# Patient Record
Sex: FEMALE | Race: BLACK OR AFRICAN AMERICAN | Employment: OTHER | URBAN - METROPOLITAN AREA
[De-identification: names, ages, dates, MRNs, and addresses within clinical notes are randomized per-mention and may not be internally consistent; named-entity substitution may affect disease eponyms.]

---

## 2017-09-09 ENCOUNTER — HOSPITAL ENCOUNTER (EMERGENCY)
Age: 22
Discharge: HOME | End: 2017-09-10
Attending: PEDIATRICS
Payer: COMMERCIAL

## 2017-09-09 DIAGNOSIS — R52 BODY ACHES: ICD-10-CM

## 2017-09-09 DIAGNOSIS — B34.9 VIRAL ILLNESS: Primary | ICD-10-CM

## 2017-09-09 PROCEDURE — 99283 EMERGENCY DEPT VISIT LOW MDM: CPT

## 2017-09-10 VITALS
OXYGEN SATURATION: 100 % | HEART RATE: 90 BPM | RESPIRATION RATE: 18 BRPM | DIASTOLIC BLOOD PRESSURE: 71 MMHG | TEMPERATURE: 98.6 F | SYSTOLIC BLOOD PRESSURE: 111 MMHG

## 2017-09-10 LAB
APPEARANCE UR: CLEAR
BACTERIA URNS QL MICRO: NEGATIVE /HPF
BILIRUB UR QL: NEGATIVE
COLOR UR: NORMAL
EPITH CASTS URNS QL MICRO: NORMAL /LPF
GLUCOSE UR STRIP.AUTO-MCNC: NEGATIVE MG/DL
HGB UR QL STRIP: NEGATIVE
HYALINE CASTS URNS QL MICRO: NORMAL /LPF (ref 0–5)
KETONES UR QL STRIP.AUTO: NEGATIVE MG/DL
LEUKOCYTE ESTERASE UR QL STRIP.AUTO: NEGATIVE
NITRITE UR QL STRIP.AUTO: NEGATIVE
PH UR STRIP: 5.5 [PH] (ref 5–8)
PROT UR STRIP-MCNC: NEGATIVE MG/DL
RBC #/AREA URNS HPF: NORMAL /HPF (ref 0–5)
SP GR UR REFRACTOMETRY: 1.02 (ref 1–1.03)
UROBILINOGEN UR QL STRIP.AUTO: 0.2 EU/DL (ref 0.2–1)
WBC URNS QL MICRO: NORMAL /HPF (ref 0–4)

## 2017-09-10 PROCEDURE — 81001 URINALYSIS AUTO W/SCOPE: CPT | Performed by: PEDIATRICS

## 2017-09-10 PROCEDURE — 81025 URINE PREGNANCY TEST: CPT

## 2017-09-10 PROCEDURE — 74011250636 HC RX REV CODE- 250/636: Performed by: PEDIATRICS

## 2017-09-10 RX ORDER — IBUPROFEN 800 MG/1
800 TABLET ORAL
Qty: 20 TAB | Refills: 0 | Status: SHIPPED | OUTPATIENT
Start: 2017-09-10 | End: 2017-09-17

## 2017-09-10 RX ORDER — DEXAMETHASONE 4 MG/1
TABLET ORAL
Status: DISCONTINUED
Start: 2017-09-10 | End: 2017-09-10 | Stop reason: HOSPADM

## 2017-09-10 RX ORDER — DEXAMETHASONE 4 MG/1
12 TABLET ORAL
Status: COMPLETED | OUTPATIENT
Start: 2017-09-10 | End: 2017-09-10

## 2017-09-10 RX ADMIN — DEXAMETHASONE 12 MG: 4 TABLET ORAL at 01:14

## 2017-09-10 NOTE — DISCHARGE INSTRUCTIONS
Viral Infections: Care Instructions  Your Care Instructions  You don't feel well, but it's not clear what's causing it. You may have a viral infection. Viruses cause many illnesses, such as the common cold, influenza, fever, rashes, and the diarrhea, nausea, and vomiting that are often called \"stomach flu. \" You may wonder if antibiotic medicines could make you feel better. But antibiotics only treat infections caused by bacteria. They don't work on viruses. The good news is that viral infections usually aren't serious. Most will go away in a few days without medical treatment. In the meantime, there are a few things you can do to make yourself more comfortable. Follow-up care is a key part of your treatment and safety. Be sure to make and go to all appointments, and call your doctor if you are having problems. It's also a good idea to know your test results and keep a list of the medicines you take. How can you care for yourself at home? · Get plenty of rest if you feel tired. · Take an over-the-counter pain medicine if needed, such as acetaminophen (Tylenol), ibuprofen (Advil, Motrin), or naproxen (Aleve). Read and follow all instructions on the label. · Be careful when taking over-the-counter cold or flu medicines and Tylenol at the same time. Many of these medicines have acetaminophen, which is Tylenol. Read the labels to make sure that you are not taking more than the recommended dose. Too much acetaminophen (Tylenol) can be harmful. · Drink plenty of fluids, enough so that your urine is light yellow or clear like water. If you have kidney, heart, or liver disease and have to limit fluids, talk with your doctor before you increase the amount of fluids you drink. · Stay home from work, school, and other public places while you have a fever. When should you call for help? Call 911 anytime you think you may need emergency care. For example, call if:  · You have severe trouble breathing.   · You passed out (lost consciousness). Call your doctor now or seek immediate medical care if:  · You seem to be getting much sicker. · You have a new or higher fever. · You have blood in your stools. · You have new belly pain, or your pain gets worse. · You have a new rash. Watch closely for changes in your health, and be sure to contact your doctor if:  · You start to get better and then get worse. · You do not get better as expected. Where can you learn more? Go to http://michaela-maximilian.info/. Enter V131 in the search box to learn more about \"Viral Infections: Care Instructions. \"  Current as of: March 3, 2017  Content Version: 11.3  © 9563-6240 [employer]. Care instructions adapted under license by [employer] (which disclaims liability or warranty for this information). If you have questions about a medical condition or this instruction, always ask your healthcare professional. [place] [address] any warranty or liability for your use of this information. We hope that we have addressed all of your medical concerns. The examination and treatment you received in the Emergency Department were for an emergent problem and were not intended as complete care. It is important that you follow up with your healthcare provider(s) for ongoing care. If your symptoms worsen or do not improve as expected, and you are unable to reach your usual health care provider(s), you should return to the Emergency Department. Today's healthcare is undergoing tremendous change, and patient satisfaction surveys are one of the many tools to assess the quality of medical care. You may receive a survey from the [employer] organization regarding your experience in the Emergency Department. I hope that your experience has been completely positive, particularly the medical care that I provided.   As such, please participate in the survey; anything less than excellent does not meet my expectations or intentions. Thank you for allowing us to provide you with medical care today. We realize that you have many choices for your emergency care needs. Please choose us in the future for any continued health care needs.       Ban Manuel MD    [address] [facility], [facility].   Office: [phone]            Recent Results (from the past 24 hour(s))   URINALYSIS W/MICROSCOPIC    Collection Time: 09/10/17 12:46 AM   Result Value Ref Range    Color YELLOW/STRAW      Appearance CLEAR CLEAR      Specific gravity 1.020 1.003 - 1.030      pH (UA) 5.5 5.0 - 8.0      Protein NEGATIVE  NEG mg/dL    Glucose NEGATIVE  NEG mg/dL    Ketone NEGATIVE  NEG mg/dL    Bilirubin NEGATIVE  NEG      Blood NEGATIVE  NEG      Urobilinogen 0.2 0.2 - 1.0 EU/dL    Nitrites NEGATIVE  NEG      Leukocyte Esterase NEGATIVE  NEG      WBC 0-4 0 - 4 /hpf    RBC 0-5 0 - 5 /hpf    Epithelial cells FEW FEW /lpf    Bacteria NEGATIVE  NEG /hpf    Hyaline cast 0-2 0 - 5 /lpf

## 2017-09-10 NOTE — ED TRIAGE NOTES
Triage note: Pt states she has been experiencing fever and generalized body aches since yesterday. Pt was seen at urgent care for the same symptoms yesterday.   +nausea

## 2017-09-10 NOTE — ED PROVIDER NOTES
Patient is a 22 y.o. female presenting with general illness. The history is provided by the patient. Generalized Body Aches   This is a new problem. The current episode started yesterday (head to toe. Motrin helps. Went to Urgent care and gave bolus. BG 76. Told to increase PO.  ). The problem occurs constantly. The problem has been gradually improving. Pertinent negatives include no chest pain, no abdominal pain, no headaches and no shortness of breath. The symptoms are aggravated by bending, twisting and exertion. The symptoms are relieved by relaxation and NSAIDs. Tonight with increased aches and fever 102 at home. Motrin helped again. Went to [address] [place] [address] ED and left due to wait. Mild pain now. Feeling much better. No sore throat, injury, sick contacts. No change in UOP. No V/D. No URI or UTI symptoms. IMM UTD    Past Medical History:   Diagnosis Date    Asthma        History reviewed. No pertinent surgical history. History reviewed. No pertinent family history. Social History     Social History    Marital status: SINGLE     Spouse name: N/A    Number of children: N/A    Years of education: N/A     Occupational History    Not on file. Social History Main Topics    Smoking status: Never Smoker    Smokeless tobacco: Never Used    Alcohol use No    Drug use: No    Sexual activity: Not on file     Other Topics Concern    Not on file     Social History Narrative    No narrative on file         ALLERGIES: Afrin [pseudoephedrine sulfate]    Review of Systems   Constitutional: Positive for fever. Negative for activity change, appetite change, chills and diaphoresis. HENT: Negative for congestion, drooling, facial swelling, sore throat and trouble swallowing. Eyes: Negative for visual disturbance. Respiratory: Negative for shortness of breath. Cardiovascular: Negative for chest pain and palpitations.    Gastrointestinal: Negative for abdominal pain, diarrhea, nausea and vomiting. Endocrine: Negative for polydipsia and polyuria. Genitourinary: Negative for decreased urine volume and dysuria. Musculoskeletal: Positive for myalgias. Negative for gait problem, neck pain and neck stiffness. Allergic/Immunologic: Negative for immunocompromised state. Neurological: Negative for syncope, light-headedness, numbness and headaches. Hematological: Negative for adenopathy. Does not bruise/bleed easily. Psychiatric/Behavioral: Negative for confusion. Vitals:    09/10/17 0004   BP: 106/63   Pulse: (!) 112   Resp: 19   Temp: 100 °F (37.8 °C)   SpO2: 97%            Physical Exam   Physical Exam   Constitutional: Appears well-developed and well-nourished. active. No distress. HENT:   Head: NCAT  Ears: Right Ear: Tympanic membrane normal. Left Ear: Tympanic membrane normal.   Nose: Nose normal. No nasal discharge. Mouth/Throat: Mucous membranes are moist. Pharynx is normal.   Eyes: Conjunctivae are normal. Right eye exhibits no discharge. Left eye exhibits no discharge. Neck: Normal range of motion. Neck supple. Cardiovascular: Normal rate, regular rhythm, S1 normal and S2 normal.  .       2+ distal pulses   Pulmonary/Chest: Effort normal and breath sounds normal. No nasal flaring or stridor. No respiratory distress. no wheezes. no rhonchi. no rales. no retraction. Abdominal: Soft. . No tenderness. no guarding. No hernia. No masses or HSM. NO CVA tenderness  Musculoskeletal: Normal range of motion. no edema,  no deformity and no signs of injury. generalized muscle pain. Lymphadenopathy:     no cervical adenopathy. Neurological:  alert. normal strength. normal muscle tone. No focal deficits. CN 2-12 intact  Skin: Skin is warm and dry. Capillary refill takes less than 3 seconds. Turgor is normal. No petechiae, no purpura and no rash noted. No cyanosis. MDM  ED Course   Patient is well hydrated, well appearing, and in no respiratory distress.  Physical exam is reassuring, and without signs of serious illness. UA is clear and no sign of blood, dehydration or myoglobin. Symptoms likely secondary to a viral syndrome. Will discharge patient home with supportive care, and follow-up with PCP within the next few days. Decadron for symptom control x1. ICD-10-CM ICD-9-CM   1. Viral illness B34.9 079.99   2. Body aches R52 780.96       Current Discharge Medication List      START taking these medications    Details   ibuprofen (MOTRIN) 800 mg tablet Take 1 Tab by mouth every six (6) hours as needed for Pain for up to 7 days. Qty: 20 Tab, Refills: 0             Follow-up Information     Follow up With Details Comments Contact Info    Kailey Burns [phone] In 3 days As needed           I have reviewed discharge instructions with the parent. The parent verbalized understanding. 1:11 AM  David Love M.D.       Procedures

## 2017-09-10 NOTE — ED NOTES
Patient education given on medication and the patient expresses understanding and acceptance of instructions.  Nadege Alan RN 9/10/2017 1:26 AM

## 2017-09-11 LAB — HCG UR QL: NEGATIVE

## 2017-10-09 ENCOUNTER — HOSPITAL ENCOUNTER (EMERGENCY)
Age: 22
Discharge: HOME | End: 2017-10-09
Attending: PEDIATRICS
Payer: COMMERCIAL

## 2017-10-09 ENCOUNTER — APPOINTMENT (OUTPATIENT)
Dept: ULTRASOUND IMAGING | Age: 22
End: 2017-10-09
Attending: NURSE PRACTITIONER
Payer: COMMERCIAL

## 2017-10-09 VITALS
WEIGHT: 119.49 LBS | RESPIRATION RATE: 16 BRPM | SYSTOLIC BLOOD PRESSURE: 125 MMHG | TEMPERATURE: 98.2 F | DIASTOLIC BLOOD PRESSURE: 72 MMHG | HEART RATE: 72 BPM | OXYGEN SATURATION: 99 %

## 2017-10-09 DIAGNOSIS — N61.0 MASTITIS, LEFT, ACUTE: Primary | ICD-10-CM

## 2017-10-09 LAB — HCG UR QL: NEGATIVE

## 2017-10-09 PROCEDURE — 81025 URINE PREGNANCY TEST: CPT

## 2017-10-09 PROCEDURE — 99283 EMERGENCY DEPT VISIT LOW MDM: CPT

## 2017-10-09 PROCEDURE — 76642 ULTRASOUND BREAST LIMITED: CPT

## 2017-10-09 RX ORDER — CLINDAMYCIN HYDROCHLORIDE 300 MG/1
300 CAPSULE ORAL 3 TIMES DAILY
Qty: 21 CAP | Refills: 0 | Status: SHIPPED | OUTPATIENT
Start: 2017-10-09 | End: 2017-10-16

## 2017-10-09 NOTE — ED TRIAGE NOTES
Pt c/ left breast pain x 2 days. Pt states she had fever yesterday that gave her generalized pain everywhere.

## 2017-10-09 NOTE — DISCHARGE INSTRUCTIONS
Mastitis: Care Instructions  Your Care Instructions  Mastitis is an inflammation of the breast. It occurs most often in women who are breastfeeding, but it can affect any woman. Mastitis can be caused by poor milk flow from the breast. When milk builds up in a breast, it leaks into the nearby breast tissue. Infection can also develop when the nipples become cracked or irritated. The tissue can then become infected with bacteria. Antibiotics can usually cure mastitis. For women who are nursing, continued breastfeeding (or pumping) can help. If mastitis is not treated, a pocket of pus may form in the breast and need to be drained. Follow-up care is a key part of your treatment and safety. Be sure to make and go to all appointments, and call your doctor if you are having problems. Its also a good idea to know your test results and keep a list of the medicines you take. How can you care for yourself at home? · If your doctor prescribed antibiotics, take them as directed. Do not stop taking them just because you feel better. You need to take the full course of antibiotics. · If you are breastfeeding, continue breastfeeding or pumping breast milk. It is important to empty your breasts regularly, every 2 to 3 hours while you are awake. These tips may help:  ¨ Before breastfeeding, place a warm, wet washcloth over your breast for about 15 minutes. Try this at least 3 times a day. This increases milk flow in the breast. Massaging the affected breast may also increase milk flow. ¨ Breastfeed on both sides. Try to start with your healthy breast first. Then, after your milk is flowing, breastfeed from the affected breast until it feels soft. You should empty this breast completely. Then switch back to the healthy breast and breastfeed until your baby has finished. ¨ Pump or hand-express a small amount of breast milk before breastfeeding if your breasts are too full with milk.  This will make your breasts less full and may make it easier for your baby to latch on to your breast.  ¨ Pump or express milk from the affected breast if it hurts too much to breastfeed. · Take an over-the-counter pain medicine, such as acetaminophen (Tylenol) or ibuprofen (Advil, Motrin) to relieve pain and fever. Read and follow all instructions on the label. · Do not take two or more pain medicines at the same time unless the doctor told you to. Many pain medicines have acetaminophen, which is Tylenol. Too much acetaminophen (Tylenol) can be harmful. · Rest as much as possible. · Drink extra fluids. · If pus is draining from your infected breast, wash the nipple gently and let it air-dry before you put your bra back on. A disposable breast pad placed in the bra cup may absorb the pus. When should you call for help? Call your doctor now or seek immediate medical care if:  · Any part of your breast becomes increasingly red, painful, swollen, or hot. · You have a new or higher fever. · You have new chills or body aches. Watch closely for changes in your health, and be sure to contact your doctor if:  · You do not get better within 2 days. Where can you learn more? Go to http://michaela-maximilian.info/. Enter Y207 in the search box to learn more about \"Mastitis: Care Instructions. \"  Current as of: May 30, 2016  Content Version: 11.3  © 3090-3605 [employer]. Care instructions adapted under license by [employer] (which disclaims liability or warranty for this information). If you have questions about a medical condition or this instruction, always ask your healthcare professional. [place] [address] any warranty or liability for your use of this information.

## 2017-10-09 NOTE — Clinical Note
Cool compresses and cabbage leaves to left breast 
You can continue motrin 600 mg by mouth every 6 hours or 800 every 8 hours as needed for pain

## 2017-10-09 NOTE — LETTER
[address] 
[address] [facility] DEPT 
[address] [place], [place] [address] [id number] 
[phone] Work/School Note Date: 10/9/2017 To Whom It May concern: 
 
Joceline June was seen and treated today in the emergency room by the following provider(s): 
Attending Provider: Jackie Brady MD 
Nurse Practitioner: Wendy Gayle NP. Joceline June may return to school on . Sincerely, 
 
 
 
 
Wendy Gayle NP

## 2018-01-29 ENCOUNTER — HOSPITAL ENCOUNTER (EMERGENCY)
Age: 23
Discharge: HOME | End: 2018-01-30
Attending: EMERGENCY MEDICINE | Admitting: EMERGENCY MEDICINE
Payer: COMMERCIAL

## 2018-01-29 ENCOUNTER — APPOINTMENT (OUTPATIENT)
Dept: ULTRASOUND IMAGING | Age: 23
End: 2018-01-29
Attending: EMERGENCY MEDICINE
Payer: COMMERCIAL

## 2018-01-29 ENCOUNTER — APPOINTMENT (OUTPATIENT)
Dept: CT IMAGING | Age: 23
End: 2018-01-29
Attending: EMERGENCY MEDICINE
Payer: COMMERCIAL

## 2018-01-29 DIAGNOSIS — R10.32 ABDOMINAL PAIN, LLQ (LEFT LOWER QUADRANT): Primary | ICD-10-CM

## 2018-01-29 LAB
ALBUMIN SERPL-MCNC: 4.1 G/DL (ref 3.5–5)
ALBUMIN/GLOB SERPL: 1 {RATIO} (ref 1.1–2.2)
ALP SERPL-CCNC: 96 U/L (ref 45–117)
ALT SERPL-CCNC: 13 U/L (ref 12–78)
ANION GAP SERPL CALC-SCNC: 7 MMOL/L (ref 5–15)
APPEARANCE UR: CLEAR
AST SERPL-CCNC: 8 U/L (ref 15–37)
BACTERIA URNS QL MICRO: NEGATIVE /HPF
BASOPHILS # BLD: 0 K/UL (ref 0–0.1)
BASOPHILS NFR BLD: 0 % (ref 0–1)
BILIRUB SERPL-MCNC: 1.1 MG/DL (ref 0.2–1)
BILIRUB UR QL: NEGATIVE
BUN SERPL-MCNC: 7 MG/DL (ref 6–20)
BUN/CREAT SERPL: 9 (ref 12–20)
CALCIUM SERPL-MCNC: 8.8 MG/DL (ref 8.5–10.1)
CHLORIDE SERPL-SCNC: 103 MMOL/L (ref 97–108)
CK SERPL-CCNC: 104 U/L (ref 26–192)
CLUE CELLS VAG QL WET PREP: NORMAL
CO2 SERPL-SCNC: 27 MMOL/L (ref 21–32)
COLOR UR: NORMAL
CREAT SERPL-MCNC: 0.77 MG/DL (ref 0.55–1.02)
DIFFERENTIAL METHOD BLD: ABNORMAL
EOSINOPHIL # BLD: 0.1 K/UL (ref 0–0.4)
EOSINOPHIL NFR BLD: 1 % (ref 0–7)
EPITH CASTS URNS QL MICRO: NORMAL /LPF
ERYTHROCYTE [DISTWIDTH] IN BLOOD BY AUTOMATED COUNT: 14.5 % (ref 11.5–14.5)
FLUAV AG NPH QL IA: NEGATIVE
FLUBV AG NOSE QL IA: NEGATIVE
GLOBULIN SER CALC-MCNC: 4.1 G/DL (ref 2–4)
GLUCOSE SERPL-MCNC: 90 MG/DL (ref 65–100)
GLUCOSE UR STRIP.AUTO-MCNC: NEGATIVE MG/DL
HCG UR QL: NEGATIVE
HCT VFR BLD AUTO: 39.2 % (ref 35–47)
HGB BLD-MCNC: 12.8 G/DL (ref 11.5–16)
HGB UR QL STRIP: NEGATIVE
HYALINE CASTS URNS QL MICRO: NORMAL /LPF (ref 0–5)
IMM GRANULOCYTES # BLD: 0 K/UL (ref 0–0.04)
IMM GRANULOCYTES NFR BLD AUTO: 0 % (ref 0–0.5)
KETONES UR QL STRIP.AUTO: NEGATIVE MG/DL
KOH PREP SPEC: NORMAL
LEUKOCYTE ESTERASE UR QL STRIP.AUTO: NEGATIVE
LYMPHOCYTES # BLD: 0.4 K/UL (ref 0.8–3.5)
LYMPHOCYTES NFR BLD: 6 % (ref 12–49)
MCH RBC QN AUTO: 25.7 PG (ref 26–34)
MCHC RBC AUTO-ENTMCNC: 32.7 G/DL (ref 30–36.5)
MCV RBC AUTO: 78.6 FL (ref 80–99)
MONOCYTES # BLD: 0.7 K/UL (ref 0–1)
MONOCYTES NFR BLD: 11 % (ref 5–13)
NEUTS SEG # BLD: 5.5 K/UL (ref 1.8–8)
NEUTS SEG NFR BLD: 82 % (ref 32–75)
NITRITE UR QL STRIP.AUTO: NEGATIVE
NRBC # BLD: 0 K/UL (ref 0–0.01)
NRBC BLD-RTO: 0 PER 100 WBC
PH UR STRIP: 6.5 [PH] (ref 5–8)
PLATELET # BLD AUTO: 320 K/UL (ref 150–400)
PMV BLD AUTO: 10.3 FL (ref 8.9–12.9)
POTASSIUM SERPL-SCNC: 3.6 MMOL/L (ref 3.5–5.1)
PROT SERPL-MCNC: 8.2 G/DL (ref 6.4–8.2)
PROT UR STRIP-MCNC: NEGATIVE MG/DL
RBC # BLD AUTO: 4.99 M/UL (ref 3.8–5.2)
RBC #/AREA URNS HPF: NORMAL /HPF (ref 0–5)
RBC MORPH BLD: ABNORMAL
SERVICE CMNT-IMP: NORMAL
SODIUM SERPL-SCNC: 137 MMOL/L (ref 136–145)
SP GR UR REFRACTOMETRY: 1.01 (ref 1–1.03)
T VAGINALIS VAG QL WET PREP: NORMAL
UR CULT HOLD, URHOLD: NORMAL
UROBILINOGEN UR QL STRIP.AUTO: 0.2 EU/DL (ref 0.2–1)
WBC # BLD AUTO: 6.7 K/UL (ref 3.6–11)
WBC URNS QL MICRO: NORMAL /HPF (ref 0–4)

## 2018-01-29 PROCEDURE — 36415 COLL VENOUS BLD VENIPUNCTURE: CPT | Performed by: STUDENT IN AN ORGANIZED HEALTH CARE EDUCATION/TRAINING PROGRAM

## 2018-01-29 PROCEDURE — 81001 URINALYSIS AUTO W/SCOPE: CPT | Performed by: STUDENT IN AN ORGANIZED HEALTH CARE EDUCATION/TRAINING PROGRAM

## 2018-01-29 PROCEDURE — 74177 CT ABD & PELVIS W/CONTRAST: CPT

## 2018-01-29 PROCEDURE — 74011000258 HC RX REV CODE- 258: Performed by: EMERGENCY MEDICINE

## 2018-01-29 PROCEDURE — 87210 SMEAR WET MOUNT SALINE/INK: CPT | Performed by: EMERGENCY MEDICINE

## 2018-01-29 PROCEDURE — 76856 US EXAM PELVIC COMPLETE: CPT

## 2018-01-29 PROCEDURE — 87804 INFLUENZA ASSAY W/OPTIC: CPT | Performed by: EMERGENCY MEDICINE

## 2018-01-29 PROCEDURE — 81025 URINE PREGNANCY TEST: CPT

## 2018-01-29 PROCEDURE — 85025 COMPLETE CBC W/AUTO DIFF WBC: CPT | Performed by: STUDENT IN AN ORGANIZED HEALTH CARE EDUCATION/TRAINING PROGRAM

## 2018-01-29 PROCEDURE — 74011636320 HC RX REV CODE- 636/320: Performed by: EMERGENCY MEDICINE

## 2018-01-29 PROCEDURE — 80053 COMPREHEN METABOLIC PANEL: CPT | Performed by: STUDENT IN AN ORGANIZED HEALTH CARE EDUCATION/TRAINING PROGRAM

## 2018-01-29 PROCEDURE — 87491 CHLMYD TRACH DNA AMP PROBE: CPT | Performed by: EMERGENCY MEDICINE

## 2018-01-29 PROCEDURE — 99285 EMERGENCY DEPT VISIT HI MDM: CPT

## 2018-01-29 PROCEDURE — 96374 THER/PROPH/DIAG INJ IV PUSH: CPT

## 2018-01-29 PROCEDURE — 82550 ASSAY OF CK (CPK): CPT | Performed by: EMERGENCY MEDICINE

## 2018-01-29 PROCEDURE — 76830 TRANSVAGINAL US NON-OB: CPT

## 2018-01-29 PROCEDURE — 74011250636 HC RX REV CODE- 250/636: Performed by: EMERGENCY MEDICINE

## 2018-01-29 PROCEDURE — 96361 HYDRATE IV INFUSION ADD-ON: CPT

## 2018-01-29 RX ORDER — SODIUM CHLORIDE 0.9 % (FLUSH) 0.9 %
10 SYRINGE (ML) INJECTION
Status: COMPLETED | OUTPATIENT
Start: 2018-01-29 | End: 2018-01-29

## 2018-01-29 RX ORDER — MORPHINE SULFATE 4 MG/ML
2 INJECTION, SOLUTION INTRAMUSCULAR; INTRAVENOUS ONCE
Status: COMPLETED | OUTPATIENT
Start: 2018-01-29 | End: 2018-01-29

## 2018-01-29 RX ADMIN — SODIUM CHLORIDE 100 ML: 900 INJECTION, SOLUTION INTRAVENOUS at 21:59

## 2018-01-29 RX ADMIN — Medication 10 ML: at 21:59

## 2018-01-29 RX ADMIN — SODIUM CHLORIDE 1000 ML: 900 INJECTION, SOLUTION INTRAVENOUS at 21:37

## 2018-01-29 RX ADMIN — IOPAMIDOL 100 ML: 755 INJECTION, SOLUTION INTRAVENOUS at 21:59

## 2018-01-29 RX ADMIN — MORPHINE SULFATE 2 MG: 4 INJECTION, SOLUTION INTRAMUSCULAR; INTRAVENOUS at 23:07

## 2018-01-30 VITALS
HEIGHT: 63 IN | TEMPERATURE: 99.3 F | SYSTOLIC BLOOD PRESSURE: 118 MMHG | DIASTOLIC BLOOD PRESSURE: 68 MMHG | WEIGHT: 123 LBS | RESPIRATION RATE: 17 BRPM | OXYGEN SATURATION: 100 % | BODY MASS INDEX: 21.79 KG/M2 | HEART RATE: 99 BPM

## 2018-01-30 NOTE — ED PROVIDER NOTES
HPI Comments: 25 y.o. female with past medical history significant for Asthma who presents from Home with chief complaint of Abdominal Pain. Patient reports sudden onset this morning of abdominal pain. Pt states constant symptoms since onset. Pt states constant moderate lower abdominal pain with radiation to her bilateral upper legs and into her back. Pt reports aggravation with palpation. Pt states accompanying nausea and a measured fever of \"100.0F\" at Home prior to arrival. Patient reports her [facility] was 1/3/18 which was normal. Pt denies urinary symptoms. Pt denies getting a Flu shot this year. Pt denies chills, cough, congestion, shortness of breath, chest pain, vomiting, diarrhea, difficulty with urination or dysuria. There are no other acute medical concerns at this time. Note written by Jame Pardo, as dictated by Vannessa Schultz MD 9:31 PM    The history is provided by the patient. Past Medical History:   Diagnosis Date    Asthma        History reviewed. No pertinent surgical history. History reviewed. No pertinent family history. Social History     Social History    Marital status: SINGLE     Spouse name: N/A    Number of children: N/A    Years of education: N/A     Occupational History    Not on file. Social History Main Topics    Smoking status: Never Smoker    Smokeless tobacco: Never Used    Alcohol use No    Drug use: No    Sexual activity: Not on file     Other Topics Concern    Not on file     Social History Narrative         ALLERGIES: Afrin [pseudoephedrine sulfate] and [address]    Review of Systems   Constitutional: Positive for fever. Negative for chills. HENT: Negative for congestion. Respiratory: Negative for cough and shortness of breath. Cardiovascular: Negative for chest pain. Gastrointestinal: Positive for abdominal pain and nausea. Negative for diarrhea and vomiting.    Genitourinary: Negative for difficulty urinating, dysuria and vaginal discharge. Musculoskeletal: Positive for back pain. All other systems reviewed and are negative. Vitals:    01/29/18 1927 01/29/18 2023 01/29/18 2100 01/29/18 2226   BP: 112/67 109/71 108/60 120/60   Pulse: (!) 130 (!) 110  (!) 116   Resp: 20 20  20   Temp: 99.1 °F (37.3 °C)   99.7 °F (37.6 °C)   SpO2: 99% 100% 99% 100%   Weight: 55.8 kg (123 lb)      Height: 5' 3\" (1.6 m)               Physical Exam   Constitutional: She is oriented to person, place, and time. She appears well-developed and well-nourished. HENT:   Head: Normocephalic and atraumatic. Nose: Nose normal.   Eyes: Conjunctivae and EOM are normal. Pupils are equal, round, and reactive to light. Neck: Normal range of motion. Neck supple. Cardiovascular: Regular rhythm, normal heart sounds and intact distal pulses. Tachycardia present. Pulmonary/Chest: Effort normal and breath sounds normal.   Abdominal: Soft. Bowel sounds are normal. She exhibits no distension. There is tenderness in the suprapubic area. There is no rebound and no guarding. Musculoskeletal: Normal range of motion. Neurological: She is alert and oriented to person, place, and time. She has normal reflexes. Skin: Skin is warm and dry. Psychiatric: She has a normal mood and affect. Her behavior is normal.   Nursing note and vitals reviewed. Note written by Jame Sawyer, as dictated by Chaparrita Lugo MD 9:31 PM      [facility]  ED Course       PROGRESS NOTE:10:18 PM  ABD CT is negative      Pelvic Exam  Date/Time: 1/29/2018 10:57 PM  Performed by: attending  Procedure duration:  10 minutes. Documented by:  Wilmer Long. As dictated by:  Dread Gutierres  Type of exam performed: speculum. External genitalia appearance: normal.    Vaginal exam:  normal.    Cervical exam:  normal and no cervical motion tenderness.     Patient tolerance: Patient tolerated the procedure well with no immediate complications        Note written by Wilmer Long Scribe, as dictated by Elbert Castleman, MD 10:58 PM        Patient still tachycardiac for uncertain reasons with pelvic and bilateral thigh pain will check CK to rule out Rhabdos as patient took Herbal tea yesterday, WBC not elevated. Will also check pelvic US to rule out torsion though doubtful.

## 2018-01-30 NOTE — DISCHARGE INSTRUCTIONS
Abdominal Pain: Care Instructions  Your Care Instructions    Abdominal pain has many possible causes. Some aren't serious and get better on their own in a few days. Others need more testing and treatment. If your pain continues or gets worse, you need to be rechecked and may need more tests to find out what is wrong. You may need surgery to correct the problem. Don't ignore new symptoms, such as fever, nausea and vomiting, urination problems, pain that gets worse, and dizziness. These may be signs of a more serious problem. Your doctor may have recommended a follow-up visit in the next 8 to 12 hours. If you are not getting better, you may need more tests or treatment. The doctor has checked you carefully, but problems can develop later. If you notice any problems or new symptoms, get medical treatment right away. Follow-up care is a key part of your treatment and safety. Be sure to make and go to all appointments, and call your doctor if you are having problems. It's also a good idea to know your test results and keep a list of the medicines you take. How can you care for yourself at home? · Rest until you feel better. · To prevent dehydration, drink plenty of fluids, enough so that your urine is light yellow or clear like water. Choose water and other caffeine-free clear liquids until you feel better. If you have kidney, heart, or liver disease and have to limit fluids, talk with your doctor before you increase the amount of fluids you drink. · If your stomach is upset, eat mild foods, such as rice, dry toast or crackers, bananas, and applesauce. Try eating several small meals instead of two or three large ones. · Wait until 48 hours after all symptoms have gone away before you have spicy foods, alcohol, and drinks that contain caffeine. · Do not eat foods that are high in fat. · Avoid anti-inflammatory medicines such as aspirin, ibuprofen (Advil, Motrin), and naproxen (Aleve).  These can cause stomach upset. Talk to your doctor if you take daily aspirin for another health problem. When should you call for help? Call 911 anytime you think you may need emergency care. For example, call if:  ? · You passed out (lost consciousness). ? · You pass maroon or very bloody stools. ? · You vomit blood or what looks like coffee grounds. ? · You have new, severe belly pain. ?Call your doctor now or seek immediate medical care if:  ? · Your pain gets worse, especially if it becomes focused in one area of your belly. ? · You have a new or higher fever. ? · Your stools are black and look like tar, or they have streaks of blood. ? · You have unexpected vaginal bleeding. ? · You have symptoms of a urinary tract infection. These may include:  ¨ Pain when you urinate. ¨ Urinating more often than usual.  ¨ Blood in your urine. ? · You are dizzy or lightheaded, or you feel like you may faint. ? Watch closely for changes in your health, and be sure to contact your doctor if:  ? · You are not getting better after 1 day (24 hours). Where can you learn more? Go to http://michaela-maximilian.info/. Enter G368 in the search box to learn more about \"Abdominal Pain: Care Instructions. \"  Current as of: March 20, 2017  Content Version: 11.4  © 4384-0551 [employer]. Care instructions adapted under license by [email or web address] (which disclaims liability or warranty for this information). If you have questions about a medical condition or this instruction, always ask your healthcare professional. [place] [address] any warranty or liability for your use of this information. We hope that we have addressed all of your medical concerns. The examination and treatment you received in the Emergency Department were for an emergent problem and were not intended as complete care. It is important that you follow up with your healthcare provider(s) for ongoing care.  If your symptoms worsen or do not improve as expected, and you are unable to reach your usual health care provider(s), you should return to the Emergency Department. Today's healthcare is undergoing tremendous change, and patient satisfaction surveys are one of the many tools to assess the quality of medical care. You may receive a survey from the [employer] regarding your experience in the Emergency Department. I hope that your experience has been completely positive, particularly the medical care that I provided. As such, please participate in the survey; anything less than excellent does not meet my expectations or intentions. [facility] and [facility] participate in nationally recognized quality of care measures. If your blood pressure is greater than 120/80, as reported below, we urge that you seek medical care to address the potential of high blood pressure, commonly known as hypertension. Hypertension can be hereditary or can be caused by certain medical conditions, pain, stress, or \"white coat syndrome. \"       Please make an appointment with your health care provider(s) for follow up of your Emergency Department visit. VITALS:   Patient Vitals for the past 8 hrs:   Temp Pulse Resp BP SpO2   01/29/18 2315 - (!) 115 17 130/75 99 %   01/29/18 2226 99.7 °F (37.6 °C) (!) 116 20 120/60 100 %   01/29/18 2100 - (!) 107 - 108/60 99 %   01/29/18 2023 - (!) 110 20 109/71 100 %   01/29/18 1927 99.1 °F (37.3 °C) (!) 130 20 112/67 99 %          Thank you for allowing us to provide you with medical care today. We realize that you have many choices for your emergency care needs. Please choose us in the future for any continued health care needs. Jameel Ruiz, [address].   Office: [phone]            Recent Results (from the past 24 hour(s))   CBC WITH AUTOMATED DIFF    Collection Time: 01/29/18  7:53 PM   Result Value Ref Range    WBC 6.7 3.6 - 11.0 K/uL    RBC 4.99 3.80 - 5.20 M/uL    HGB 12.8 11.5 - 16.0 g/dL    HCT 39.2 35.0 - 47.0 %    MCV 78.6 (L) 80.0 - 99.0 FL    MCH 25.7 (L) 26.0 - 34.0 PG    MCHC 32.7 30.0 - 36.5 g/dL    RDW 14.5 11.5 - 14.5 %    PLATELET 144 577 - 965 K/uL    MPV 10.3 8.9 - 12.9 FL    NRBC 0.0 0  WBC    ABSOLUTE NRBC 0.00 0.00 - 0.01 K/uL    NEUTROPHILS 82 (H) 32 - 75 %    LYMPHOCYTES 6 (L) 12 - 49 %    MONOCYTES 11 5 - 13 %    EOSINOPHILS 1 0 - 7 %    BASOPHILS 0 0 - 1 %    IMMATURE GRANULOCYTES 0 0.0 - 0.5 %    ABS. NEUTROPHILS 5.5 1.8 - 8.0 K/UL    ABS. LYMPHOCYTES 0.4 (L) 0.8 - 3.5 K/UL    ABS. MONOCYTES 0.7 0.0 - 1.0 K/UL    ABS. EOSINOPHILS 0.1 0.0 - 0.4 K/UL    ABS. BASOPHILS 0.0 0.0 - 0.1 K/UL    ABS. IMM. GRANS. 0.0 0.00 - 0.04 K/UL    DF SMEAR SCANNED      RBC COMMENTS NORMOCYTIC, NORMOCHROMIC     METABOLIC PANEL, COMPREHENSIVE    Collection Time: 01/29/18  7:53 PM   Result Value Ref Range    Sodium 137 136 - 145 mmol/L    Potassium 3.6 3.5 - 5.1 mmol/L    Chloride 103 97 - 108 mmol/L    CO2 27 21 - 32 mmol/L    Anion gap 7 5 - 15 mmol/L    Glucose 90 65 - 100 mg/dL    BUN 7 6 - 20 MG/DL    Creatinine 0.77 0.55 - 1.02 MG/DL    BUN/Creatinine ratio 9 (L) 12 - 20      GFR est AA >60 >60 ml/min/1.73m2    GFR est non-AA >60 >60 ml/min/1.73m2    Calcium 8.8 8.5 - 10.1 MG/DL    Bilirubin, total 1.1 (H) 0.2 - 1.0 MG/DL    ALT (SGPT) 13 12 - 78 U/L    AST (SGOT) 8 (L) 15 - 37 U/L    Alk.  phosphatase 96 45 - 117 U/L    Protein, total 8.2 6.4 - 8.2 g/dL    Albumin 4.1 3.5 - 5.0 g/dL    Globulin 4.1 (H) 2.0 - 4.0 g/dL    A-G Ratio 1.0 (L) 1.1 - 2.2     URINALYSIS W/MICROSCOPIC    Collection Time: 01/29/18  7:53 PM   Result Value Ref Range    Color YELLOW/STRAW      Appearance CLEAR CLEAR      Specific gravity 1.006 1.003 - 1.030      pH (UA) 6.5 5.0 - 8.0      Protein NEGATIVE  NEG mg/dL    Glucose NEGATIVE  NEG mg/dL    Ketone NEGATIVE  NEG mg/dL Bilirubin NEGATIVE  NEG      Blood NEGATIVE  NEG      Urobilinogen 0.2 0.2 - 1.0 EU/dL    Nitrites NEGATIVE  NEG      Leukocyte Esterase NEGATIVE  NEG      WBC 0-4 0 - 4 /hpf    RBC 0-5 0 - 5 /hpf    Epithelial cells FEW FEW /lpf    Bacteria NEGATIVE  NEG /hpf    Hyaline cast 0-2 0 - 5 /lpf   URINE CULTURE HOLD SAMPLE    Collection Time: 01/29/18  7:53 PM   Result Value Ref Range    Urine culture hold URINE ON HOLD IN MICROBIOLOGY DEPT FOR 3 DAYS     INFLUENZA A & B AG (RAPID TEST)    Collection Time: 01/29/18  7:53 PM   Result Value Ref Range    Influenza A Antigen NEGATIVE  NEG      Influenza B Antigen NEGATIVE  NEG     CK W/ REFLX CKMB    Collection Time: 01/29/18  7:54 PM   Result Value Ref Range     26 - 192 U/L   HCG URINE, QL. - POC    Collection Time: 01/29/18  9:34 PM   Result Value Ref Range    Pregnancy test,urine (POC) NEGATIVE  NEG     WET PREP    Collection Time: 01/29/18 11:09 PM   Result Value Ref Range    Clue cells CLUE CELLS ABSENT      Wet prep NO TRICHOMONAS SEEN     KOH, OTHER SOURCES    Collection Time: 01/29/18 11:09 PM   Result Value Ref Range    Special Requests: NO SPECIAL REQUESTS      KOH NO YEAST SEEN         Ct Abd Pelv W Cont    Result Date: 1/29/2018  EXAM: CT ABDOMEN PELVIS WITH CONTRAST INDICATION:  Pelvic pain and tachycardia. COMPARISON: None. CONTRAST: 100 mL of Isovue-370. TECHNIQUE: Multislice helical CT was performed from the diaphragm to the symphysis pubis during uneventful rapid bolus intravenous contrast administration. Oral contrast was not administered. Contiguous 5 mm axial images were reconstructed and lung and soft tissue windows were generated. Coronal and sagittal reformations were generated. CT dose reduction was achieved through use of a standardized protocol tailored for this examination and automatic exposure control for dose modulation. FINDINGS: LOWER CHEST: The visualized portions of the lung bases are clear. ABDOMEN: Liver:  The liver is normal in size and contour with no focal abnormality. Gallbladder and bile ducts: There is no calcified gallstone or biliary dilatation. Spleen: No abnormality. Pancreas: No abnormality. Adrenal glands: No abnormality. Kidneys: No abnormality. PELVIS: Reproductive organs: The uterus is normal in size with a small amount of fluid in the endometrial cavity. The ovaries are normal. There are small follicles. Bladder: No abnormality. BOWEL AND MESENTERY: The small bowel is normal.  There is no mesenteric mass or adenopathy. The appendix is not identified but there is no inflammatory process adjacent to the cecum. There is a moderately large amount of gas and stool throughout the colon. PERITONEUM: There is no ascites or free intraperitoneal air. RETROPERITONEUM: The aorta  tapers without aneurysm. There is no retroperitoneal adenopathy or mass. There is no pelvic mass or adenopathy. BONES AND SOFT TISSUES: The bones and soft tissues of the abdominal wall are within normal limits. IMPRESSION: No acute abdominal or pelvic abnormality identified. Levorn Gandhi

## 2018-01-30 NOTE — ED NOTES
Pt ambulatory to restroom with slow and steady pain. Pt reports that pain is stronger upon standing and ambulating. MD notified.

## 2018-01-30 NOTE — ED NOTES
Pt resting on stretcher with eyes closed awaiting provider. Family at the bedside. Call bell within reach. Will continue to monitor.

## 2018-01-30 NOTE — ED NOTES
Discharge instructions given to pt by MD.  All questions answered and pt verbalized understanding. V/S stable @ time of discharge. No lines or drains in place. Pt ambulatory out of unit and taken home by family.

## 2018-01-30 NOTE — ED TRIAGE NOTES
Pt reports having abdominal that began this morning. Pt states the pain came on suddenly to lower abdomen and is radiating into the front of both legs and is causing her to be nauseated. Pt denies constipation, diarrhea or vaginal discharge.

## 2018-01-31 LAB
C TRACH DNA SPEC QL NAA+PROBE: NEGATIVE
N GONORRHOEA DNA SPEC QL NAA+PROBE: NEGATIVE
SAMPLE TYPE: NORMAL
SERVICE CMNT-IMP: NORMAL
SPECIMEN SOURCE: NORMAL

## 2018-03-18 ENCOUNTER — APPOINTMENT (OUTPATIENT)
Dept: GENERAL RADIOLOGY | Age: 23
End: 2018-03-18
Attending: EMERGENCY MEDICINE
Payer: COMMERCIAL

## 2018-03-18 ENCOUNTER — HOSPITAL ENCOUNTER (EMERGENCY)
Age: 23
Discharge: HOME | End: 2018-03-18
Attending: EMERGENCY MEDICINE
Payer: COMMERCIAL

## 2018-03-18 VITALS
WEIGHT: 130.6 LBS | HEART RATE: 114 BPM | RESPIRATION RATE: 16 BRPM | BODY MASS INDEX: 23.14 KG/M2 | HEIGHT: 63 IN | DIASTOLIC BLOOD PRESSURE: 63 MMHG | TEMPERATURE: 98.7 F | SYSTOLIC BLOOD PRESSURE: 115 MMHG | OXYGEN SATURATION: 97 %

## 2018-03-18 DIAGNOSIS — J18.9 COMMUNITY ACQUIRED PNEUMONIA OF LEFT LOWER LOBE OF LUNG: Primary | ICD-10-CM

## 2018-03-18 LAB
ALBUMIN SERPL-MCNC: 3.7 G/DL (ref 3.5–5)
ALBUMIN/GLOB SERPL: 0.9 {RATIO} (ref 1.1–2.2)
ALP SERPL-CCNC: 94 U/L (ref 45–117)
ALT SERPL-CCNC: 12 U/L (ref 12–78)
ANION GAP SERPL CALC-SCNC: 10 MMOL/L (ref 5–15)
APPEARANCE UR: CLEAR
AST SERPL-CCNC: 8 U/L (ref 15–37)
BACTERIA URNS QL MICRO: NEGATIVE /HPF
BASOPHILS # BLD: 0 K/UL (ref 0–0.1)
BASOPHILS NFR BLD: 0 % (ref 0–1)
BILIRUB SERPL-MCNC: 0.8 MG/DL (ref 0.2–1)
BILIRUB UR QL: NEGATIVE
BUN SERPL-MCNC: 13 MG/DL (ref 6–20)
BUN/CREAT SERPL: 16 (ref 12–20)
CALCIUM SERPL-MCNC: 9.1 MG/DL (ref 8.5–10.1)
CHLORIDE SERPL-SCNC: 103 MMOL/L (ref 97–108)
CO2 SERPL-SCNC: 24 MMOL/L (ref 21–32)
COLOR UR: NORMAL
CREAT SERPL-MCNC: 0.8 MG/DL (ref 0.55–1.02)
DIFFERENTIAL METHOD BLD: ABNORMAL
EOSINOPHIL # BLD: 0 K/UL (ref 0–0.4)
EOSINOPHIL NFR BLD: 0 % (ref 0–7)
EPITH CASTS URNS QL MICRO: NORMAL /LPF
ERYTHROCYTE [DISTWIDTH] IN BLOOD BY AUTOMATED COUNT: 14.8 % (ref 11.5–14.5)
GLOBULIN SER CALC-MCNC: 4.2 G/DL (ref 2–4)
GLUCOSE SERPL-MCNC: 118 MG/DL (ref 65–100)
GLUCOSE UR STRIP.AUTO-MCNC: NEGATIVE MG/DL
HCG UR QL: NEGATIVE
HCT VFR BLD AUTO: 35.2 % (ref 35–47)
HGB BLD-MCNC: 11.8 G/DL (ref 11.5–16)
HGB UR QL STRIP: NEGATIVE
HYALINE CASTS URNS QL MICRO: NORMAL /LPF (ref 0–5)
IMM GRANULOCYTES # BLD: 0 K/UL
IMM GRANULOCYTES NFR BLD AUTO: 0 %
KETONES UR QL STRIP.AUTO: NEGATIVE MG/DL
LEUKOCYTE ESTERASE UR QL STRIP.AUTO: NEGATIVE
LIPASE SERPL-CCNC: 119 U/L (ref 73–393)
LYMPHOCYTES # BLD: 0.5 K/UL (ref 0.8–3.5)
LYMPHOCYTES NFR BLD: 4 % (ref 12–49)
MCH RBC QN AUTO: 25.8 PG (ref 26–34)
MCHC RBC AUTO-ENTMCNC: 33.5 G/DL (ref 30–36.5)
MCV RBC AUTO: 76.9 FL (ref 80–99)
MONOCYTES # BLD: 0.9 K/UL (ref 0–1)
MONOCYTES NFR BLD: 8 % (ref 5–13)
NEUTS BAND NFR BLD MANUAL: 14 % (ref 0–6)
NEUTS SEG # BLD: 10 K/UL (ref 1.8–8)
NEUTS SEG NFR BLD: 74 % (ref 32–75)
NITRITE UR QL STRIP.AUTO: NEGATIVE
NRBC # BLD: 0 K/UL (ref 0–0.01)
NRBC BLD-RTO: 0 PER 100 WBC
PH UR STRIP: 7.5 [PH] (ref 5–8)
PLATELET # BLD AUTO: 353 K/UL (ref 150–400)
PLATELET COMMENTS,PCOM: ABNORMAL
PMV BLD AUTO: 10.2 FL (ref 8.9–12.9)
POTASSIUM SERPL-SCNC: 4 MMOL/L (ref 3.5–5.1)
PROT SERPL-MCNC: 7.9 G/DL (ref 6.4–8.2)
PROT UR STRIP-MCNC: NEGATIVE MG/DL
RBC # BLD AUTO: 4.58 M/UL (ref 3.8–5.2)
RBC #/AREA URNS HPF: NORMAL /HPF (ref 0–5)
RBC MORPH BLD: ABNORMAL
SODIUM SERPL-SCNC: 137 MMOL/L (ref 136–145)
SP GR UR REFRACTOMETRY: 1.02 (ref 1–1.03)
UR CULT HOLD, URHOLD: NORMAL
UROBILINOGEN UR QL STRIP.AUTO: 0.2 EU/DL (ref 0.2–1)
WBC # BLD AUTO: 11.4 K/UL (ref 3.6–11)
WBC URNS QL MICRO: NORMAL /HPF (ref 0–4)

## 2018-03-18 PROCEDURE — 85025 COMPLETE CBC W/AUTO DIFF WBC: CPT | Performed by: EMERGENCY MEDICINE

## 2018-03-18 PROCEDURE — 81001 URINALYSIS AUTO W/SCOPE: CPT | Performed by: EMERGENCY MEDICINE

## 2018-03-18 PROCEDURE — 71046 X-RAY EXAM CHEST 2 VIEWS: CPT

## 2018-03-18 PROCEDURE — 81025 URINE PREGNANCY TEST: CPT

## 2018-03-18 PROCEDURE — 83690 ASSAY OF LIPASE: CPT | Performed by: EMERGENCY MEDICINE

## 2018-03-18 PROCEDURE — 96360 HYDRATION IV INFUSION INIT: CPT

## 2018-03-18 PROCEDURE — 99283 EMERGENCY DEPT VISIT LOW MDM: CPT

## 2018-03-18 PROCEDURE — 36415 COLL VENOUS BLD VENIPUNCTURE: CPT | Performed by: EMERGENCY MEDICINE

## 2018-03-18 PROCEDURE — 80053 COMPREHEN METABOLIC PANEL: CPT | Performed by: EMERGENCY MEDICINE

## 2018-03-18 PROCEDURE — 74011250636 HC RX REV CODE- 250/636: Performed by: EMERGENCY MEDICINE

## 2018-03-18 PROCEDURE — 74011250637 HC RX REV CODE- 250/637: Performed by: EMERGENCY MEDICINE

## 2018-03-18 RX ORDER — ACETAMINOPHEN 325 MG/1
975 TABLET ORAL
Status: COMPLETED | OUTPATIENT
Start: 2018-03-18 | End: 2018-03-18

## 2018-03-18 RX ORDER — DOXYCYCLINE HYCLATE 100 MG
100 TABLET ORAL 2 TIMES DAILY
Qty: 20 TAB | Refills: 0 | Status: SHIPPED | OUTPATIENT
Start: 2018-03-18 | End: 2018-03-28

## 2018-03-18 RX ADMIN — ACETAMINOPHEN 975 MG: 325 TABLET, FILM COATED ORAL at 09:01

## 2018-03-18 RX ADMIN — SODIUM CHLORIDE 1000 ML: 900 INJECTION, SOLUTION INTRAVENOUS at 08:13

## 2018-03-18 NOTE — ED NOTES
Pt tolerated water and crackers with no n/v. Pt states she feels better. Pt discharged home with parent/guardian. Pt acting age appropriately, respirations regular and unlabored, cap refill less than two seconds. Skin pink, dry and warm. Lungs clear bilaterally. No further complaints at this time. Parent/guardian verbalized understanding of discharge paperwork and has no further questions at this time. Education provided about continuation of care, follow up care and medication administration. Parent/guardian able to provided teach back about discharge instructions.

## 2018-03-18 NOTE — ED PROVIDER NOTES
HPI Comments: 76-year-old female presents to the emergency department with several day history of a sore throat associated with a dry nonproductive cough. In addition she's had episodes of nausea however no vomiting or diarrhea. States she has aching muscles. She is accompanied by her mother to the emergency department, states the fever has gone to 101.8. The patient is breast-feeding, denies any ill contacts. Does not work in healthcare. Patient is a 25 y.o. female presenting with vomiting, cough, and fever. Vomiting    Associated symptoms include a fever and cough. Pertinent negatives include no chills, no abdominal pain, no diarrhea, no headaches, no arthralgias, no myalgias and no headaches. Cough   Associated symptoms include vomiting. Pertinent negatives include no chest pain, no chills, no eye redness, no headaches, no rhinorrhea, no sore throat, no myalgias, no shortness of breath, no wheezing, no nausea and no confusion. Fever    Associated symptoms include vomiting and cough. Pertinent negatives include no chest pain, no diarrhea, no congestion, no headaches, no sore throat, no shortness of breath and no rash. Past Medical History:   Diagnosis Date    Asthma        History reviewed. No pertinent surgical history. History reviewed. No pertinent family history. Social History     Social History    Marital status: SINGLE     Spouse name: N/A    Number of children: N/A    Years of education: N/A     Occupational History    Not on file. Social History Main Topics    Smoking status: Never Smoker    Smokeless tobacco: Never Used    Alcohol use No    Drug use: No    Sexual activity: Not on file     Other Topics Concern    Not on file     Social History Narrative         ALLERGIES: Afrin [pseudoephedrine sulfate] and [address]    Review of Systems   Constitutional: Positive for fever. Negative for chills and diaphoresis.    HENT: Negative for congestion, postnasal drip, rhinorrhea and sore throat. Eyes: Negative for photophobia, discharge, redness and visual disturbance. Respiratory: Positive for cough. Negative for chest tightness, shortness of breath and wheezing. Cardiovascular: Negative for chest pain, palpitations and leg swelling. Gastrointestinal: Positive for vomiting. Negative for abdominal distention, abdominal pain, blood in stool, constipation, diarrhea and nausea. Genitourinary: Negative for difficulty urinating, dysuria, frequency, hematuria and urgency. Musculoskeletal: Negative for arthralgias, back pain, joint swelling and myalgias. Skin: Negative for color change and rash. Neurological: Negative for dizziness, speech difficulty, weakness, light-headedness, numbness and headaches. Psychiatric/Behavioral: Negative for confusion. The patient is not nervous/anxious. All other systems reviewed and are negative. Vitals:    03/18/18 0714   BP: 108/62   Pulse: (!) 130   Resp: 16   Temp: 99.5 °F (37.5 °C)   SpO2: 96%   Weight: 59.2 kg (130 lb 9.6 oz)   Height: 5' 3\" (1.6 m)            Physical Exam   Constitutional: She is oriented to person, place, and time. She appears well-developed and well-nourished. No distress. HENT:   Head: Normocephalic and atraumatic. Right Ear: External ear normal.   Left Ear: External ear normal.   Nose: Nose normal.   Mouth/Throat: Oropharynx is clear and moist. No oropharyngeal exudate. Eyes: Conjunctivae and EOM are normal. Pupils are equal, round, and reactive to light. No scleral icterus. Neck: Normal range of motion. Neck supple. No JVD present. No tracheal deviation present. No thyromegaly present. Cardiovascular: Normal rate, regular rhythm and normal heart sounds. Exam reveals no gallop and no friction rub. No murmur heard. Pulmonary/Chest: Effort normal and breath sounds normal. No respiratory distress. She has no wheezes. She has no rales. She exhibits no tenderness. Abdominal: Soft.  Bowel sounds are normal. She exhibits no distension and no mass. There is no tenderness. There is no rebound and no guarding. Musculoskeletal: Normal range of motion. She exhibits no edema or tenderness. Lymphadenopathy:     She has no cervical adenopathy. Neurological: She is alert and oriented to person, place, and time. She has normal strength. She displays no atrophy and no tremor. No cranial nerve deficit. She exhibits normal muscle tone. Coordination and gait normal.   Skin: Skin is warm and dry. No rash noted. She is not diaphoretic. No erythema. Psychiatric: She has a normal mood and affect. Her behavior is normal. Judgment and thought content normal.   Nursing note and vitals reviewed. MDM  Number of Diagnoses or Management Options  Diagnosis management comments: Impression: A 81-year-old female arriving to the emergency department with sore throat and a dry nonproductive cough. Differential includes viral upper respiratory tract infection, consider pneumonia. Plan of care we baseline labs, IV fluids, fever control, chest x-ray and we'll treat accordingly. ED Course       Procedures    Duty no beds in the emergency department the patient has been sent back to the lobby she will receive her IV fluids as well as acetaminophen. She'll await lab studies and radiology in the lobby.

## 2018-03-18 NOTE — ED TRIAGE NOTES
Patient arrives to ED with c/o fever, cough and vomiting, states cough x 2 days, fever of 101.8 F this morning, and a dry cough, no nausea, or diarrhea

## 2018-03-18 NOTE — DISCHARGE INSTRUCTIONS

## 2019-06-24 ENCOUNTER — APPOINTMENT (OUTPATIENT)
Dept: CT IMAGING | Age: 24
End: 2019-06-24
Attending: EMERGENCY MEDICINE
Payer: SELF-PAY

## 2019-06-24 ENCOUNTER — HOSPITAL ENCOUNTER (EMERGENCY)
Age: 24
Discharge: HOME | End: 2019-06-24
Attending: EMERGENCY MEDICINE
Payer: SELF-PAY

## 2019-06-24 VITALS
DIASTOLIC BLOOD PRESSURE: 66 MMHG | HEART RATE: 70 BPM | TEMPERATURE: 98.1 F | SYSTOLIC BLOOD PRESSURE: 110 MMHG | OXYGEN SATURATION: 100 % | RESPIRATION RATE: 14 BRPM

## 2019-06-24 DIAGNOSIS — R51.9 NONINTRACTABLE HEADACHE, UNSPECIFIED CHRONICITY PATTERN, UNSPECIFIED HEADACHE TYPE: Primary | ICD-10-CM

## 2019-06-24 LAB
COMMENT, HOLDF: NORMAL
HCG UR QL: NEGATIVE
SAMPLES BEING HELD,HOLD: NORMAL

## 2019-06-24 PROCEDURE — 81025 URINE PREGNANCY TEST: CPT

## 2019-06-24 PROCEDURE — 99283 EMERGENCY DEPT VISIT LOW MDM: CPT

## 2019-06-24 PROCEDURE — 74011250636 HC RX REV CODE- 250/636: Performed by: EMERGENCY MEDICINE

## 2019-06-24 PROCEDURE — 96374 THER/PROPH/DIAG INJ IV PUSH: CPT

## 2019-06-24 PROCEDURE — 70450 CT HEAD/BRAIN W/O DYE: CPT

## 2019-06-24 RX ORDER — KETOROLAC TROMETHAMINE 30 MG/ML
15 INJECTION, SOLUTION INTRAMUSCULAR; INTRAVENOUS
Status: COMPLETED | OUTPATIENT
Start: 2019-06-24 | End: 2019-06-24

## 2019-06-24 RX ADMIN — KETOROLAC TROMETHAMINE 15 MG: 30 INJECTION, SOLUTION INTRAMUSCULAR at 21:42

## 2019-06-24 NOTE — ED TRIAGE NOTES
QUICKLOOK: Patient arrives for headache for a hour. Denies taking anything prior to arrival. Reports sensitivity to light.

## 2019-06-25 NOTE — DISCHARGE INSTRUCTIONS
Ibuprofen or tylenol as needed for pain. Rest  Return to ER for any fever, chills, nausea, vomiting, visual disturbances, return of headache     Headache: Care Instructions  Your Care Instructions    Headaches have many possible causes. Most headaches aren't a sign of a more serious problem, and they will get better on their own. Home treatment may help you feel better faster. The doctor has checked you carefully, but problems can develop later. If you notice any problems or new symptoms, get medical treatment right away. Follow-up care is a key part of your treatment and safety. Be sure to make and go to all appointments, and call your doctor if you are having problems. It's also a good idea to know your test results and keep a list of the medicines you take. How can you care for yourself at home? · Do not drive if you have taken a prescription pain medicine. · Rest in a quiet, dark room until your headache is gone. Close your eyes and try to relax or go to sleep. Don't watch TV or read. · Put a cold, moist cloth or cold pack on the painful area for 10 to 20 minutes at a time. Put a thin cloth between the cold pack and your skin. · Use a warm, moist towel or a heating pad set on low to relax tight shoulder and neck muscles. · Have someone gently massage your neck and shoulders. · Take pain medicines exactly as directed. ? If the doctor gave you a prescription medicine for pain, take it as prescribed. ? If you are not taking a prescription pain medicine, ask your doctor if you can take an over-the-counter medicine. · Be careful not to take pain medicine more often than the instructions allow, because you may get worse or more frequent headaches when the medicine wears off. · Do not ignore new symptoms that occur with a headache, such as a fever, weakness or numbness, vision changes, or confusion. These may be signs of a more serious problem.   To prevent headaches  · Keep a headache diary so you can figure out what triggers your headaches. Avoiding triggers may help you prevent headaches. Record when each headache began, how long it lasted, and what the pain was like (throbbing, aching, stabbing, or dull). Write down any other symptoms you had with the headache, such as nausea, flashing lights or dark spots, or sensitivity to bright light or loud noise. Note if the headache occurred near your period. List anything that might have triggered the headache, such as certain foods (chocolate, cheese, wine) or odors, smoke, bright light, stress, or lack of sleep. · Find healthy ways to deal with stress. Headaches are most common during or right after stressful times. Take time to relax before and after you do something that has caused a headache in the past.  · Try to keep your muscles relaxed by keeping good posture. Check your jaw, face, neck, and shoulder muscles for tension, and try relaxing them. When sitting at a desk, change positions often, and stretch for 30 seconds each hour. · Get plenty of sleep and exercise. · Eat regularly and well. Long periods without food can trigger a headache. · Treat yourself to a massage. Some people find that regular massages are very helpful in relieving tension. · Limit caffeine by not drinking too much coffee, tea, or soda. But don't quit caffeine suddenly, because that can also give you headaches. · Reduce eyestrain from computers by blinking frequently and looking away from the computer screen every so often. Make sure you have proper eyewear and that your monitor is set up properly, about an arm's length away. · Seek help if you have depression or anxiety. Your headaches may be linked to these conditions. Treatment can both prevent headaches and help with symptoms of anxiety or depression. When should you call for help? Call 911 anytime you think you may need emergency care. For example, call if:    · You have signs of a stroke.  These may include:  ? Sudden numbness, paralysis, or weakness in your face, arm, or leg, especially on only one side of your body. ? Sudden vision changes. ? Sudden trouble speaking. ? Sudden confusion or trouble understanding simple statements. ? Sudden problems with walking or balance. ? A sudden, severe headache that is different from past headaches.    Call your doctor now or seek immediate medical care if:    · You have a new or worse headache.     · Your headache gets much worse. Where can you learn more? Go to http://michaela-maximilian.info/. Enter M271 in the search box to learn more about \"Headache: Care Instructions. \"  Current as of: Sarika 3, 2018  Content Version: 11.9  © 1638-9921 [employer]. Care instructions adapted under license by [employer] (which disclaims liability or warranty for this information). If you have questions about a medical condition or this instruction, always ask your healthcare professional. [address] any warranty or liability for your use of this information.

## 2019-06-25 NOTE — ED TRIAGE NOTES
Patient comes in from home. Reports headache that started at 1830. Reports it is on the LEFT side of her face and behind her eyes. Has not taken any medication for it at this time. Reports double vision.

## 2019-06-25 NOTE — ED PROVIDER NOTES
26 y.o. female with past medical history significant for asthma, presents ambulatory to the ED with chief complaint of a headache. Patient states that she felt the onset of a headache between 7490-6776 this evening. She had just given her two-year-old child a bath when the pain started. Slowly worsened. Her current discomfort is located behind the right eye with radiation into the right side of the face. She reports a history of similar pain on one previous occasions. Patient denies taking any medications for treatment of her discomfort prior to arrival. The pain has been constant since it started, but now it is starting to lessen in severity. She rates her current pain level as a 7/10, and she describes the pain as \"throbbing and sharp\". Denies any exacerbating or mitigating factors for the pain. She reports associated blurry , nausea, rhinorrhea, and congestion. Patient specifically denies fevers, sore throat, upper back or neck pain, vomiting, dizziness, or lightheadedness. There are no other acute medical concerns at this time. Social hx: Denies Tobacco use; Denies EtOH use; Denies Illicit Drug Abuse    PCP: None    Note written by Lachelle Lyn, as dictated by Rosanna Johnson PA-C 9:02 PM     The history is provided by the patient. No  was used. Past Medical History:   Diagnosis Date    Asthma        No past surgical history on file. No family history on file.     Social History     Socioeconomic History    Marital status: SINGLE     Spouse name: Not on file    Number of children: Not on file    Years of education: Not on file    Highest education level: Not on file   Occupational History    Not on file   Social Needs    Financial resource strain: Not on file    Food insecurity:     Worry: Not on file     Inability: Not on file    Transportation needs:     Medical: Not on file     Non-medical: Not on file   Tobacco Use    Smoking status: Never Smoker    Smokeless tobacco: Never Used   Substance and Sexual Activity    Alcohol use: No    Drug use: No    Sexual activity: Not on file   Lifestyle    Physical activity:     Days per week: Not on file     Minutes per session: Not on file    Stress: Not on file   Relationships    Social connections:     Talks on phone: Not on file     Gets together: Not on file     Attends Mormon service: Not on file     Active member of club or organization: Not on file     Attends meetings of clubs or organizations: Not on file     Relationship status: Not on file    Intimate partner violence:     Fear of current or ex partner: Not on file     Emotionally abused: Not on file     Physically abused: Not on file     Forced sexual activity: Not on file   Other Topics Concern    Not on file   Social History Narrative    Not on file         ALLERGIES: Afrin [pseudoephedrine sulfate] and [address]    Review of Systems   Constitutional: Negative for fever. HENT: Positive for congestion and rhinorrhea. Negative for sore throat. Eyes: Positive for photophobia and visual disturbance. Respiratory: Negative for cough, chest tightness and shortness of breath. Gastrointestinal: Positive for nausea. Negative for vomiting. Musculoskeletal: Negative for neck pain. Skin: Negative for color change and rash. Neurological: Positive for headaches. Negative for dizziness, weakness and light-headedness. All other systems reviewed and are negative. Vitals:    06/24/19 1955 06/24/19 2106   BP:  113/70   Pulse: (!) 115 80   Resp: 16 14   Temp:  97.9 °F (36.6 °C)   SpO2: 98% 100%            Physical Exam     Constitutional: Oriented to person, place, and time. Appears well-developed and well-nourished. No distress. HENT:   Head: Normocephalic and atraumatic. Eyes: Conjunctivae and EOM are normal. Pupils are equal, round, and reactive to light. Neck: Normal range of motion. Neck supple.    Cardiovascular: Normal rate and regular rhythm. Pulmonary/Chest: Effort normal and breath sounds normal.   Abdominal: Soft. There is no tenderness. There is no rebound and no guarding. Musculoskeletal: Normal range of motion. Neurological: Alert and oriented to person, place, and time. Displays normal reflexes. No cranial nerve deficit. Exhibits normal muscle tone. Coordination normal.   Pt able to perform finger-finger, finger-nose, heel-shin. 2+AC reflexes bilaterally    Cranial Nerves:  I: smell  Not tested   II: pupils  Equal, round, reactive to light   III,VII: ptosis  none   III,IV,VI: extraocular muscles   normal   V: mastication  normal   V: facial light touch sensation   normal   VII: facial muscle function   symmetric   VIII: hearing  symmetric   IX: soft palate elevation   normal   XI: sternocleidomastoid strength  5/5   XII: tongue   midline          Skin: Skin is warm and dry. Capillary refill takes less than 2 seconds. No erythema. Psychiatric: Normal mood and affect. Behavior is normal. Judgment and thought content normal.       MDM  Number of Diagnoses or Management Options  Nonintractable headache, unspecified chronicity pattern, unspecified headache type:   Diagnosis management comments:     10:28 PM  Pt reevaluated. Pt feeling much better. Headache resolved. No pain. No neck pain or body pain. Vision normal.  Patient is well hydrated, well appearing, and in no respiratory distress. Physical exam is reassuring, and without signs of serious illness. Will discharge patient home with supportive care, and follow-up with PCP within the next few days. Amount and/or Complexity of Data Reviewed  Discuss the patient with other providers: yes (ER attending-[place])    Patient Progress  Patient progress: stable         Procedures      Pt case including HPI, PE, and all available lab and radiology results has been discussed with attending physician. Opportunity to evaluate patient has been provided to ER attending. Discharge and prescription plan has been agreed upon.

## 2019-06-25 NOTE — ED NOTES
Patient medically cleared for discharge at this time. Given all discharge instructions and education at this time by provider. Seen leaving the department with a steady gait and all belongings,.

## 2019-08-20 ENCOUNTER — HOSPITAL ENCOUNTER (EMERGENCY)
Age: 24
Discharge: HOME | End: 2019-08-21
Attending: EMERGENCY MEDICINE
Payer: COMMERCIAL
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
## 2019-08-20 PROCEDURE — 99284 EMERGENCY DEPT VISIT MOD MDM: CPT

## 2019-08-20 PROCEDURE — 75810000275 HC EMERGENCY DEPT VISIT NO LEVEL OF CARE

## 2019-08-21 VITALS
TEMPERATURE: 97.2 F | OXYGEN SATURATION: 98 % | SYSTOLIC BLOOD PRESSURE: 104 MMHG | RESPIRATION RATE: 18 BRPM | DIASTOLIC BLOOD PRESSURE: 63 MMHG | HEART RATE: 63 BPM

## 2019-08-21 LAB
C TRACH DNA SPEC QL NAA+PROBE: NEGATIVE
CLUE CELLS VAG QL WET PREP: NORMAL
KOH PREP SPEC: NORMAL
N GONORRHOEA DNA SPEC QL NAA+PROBE: NEGATIVE
RPR SER QL: NONREACTIVE
SAMPLE TYPE: NORMAL
SERVICE CMNT-IMP: NORMAL
SERVICE CMNT-IMP: NORMAL
SPECIMEN SOURCE: NORMAL
T VAGINALIS VAG QL WET PREP: NORMAL

## 2019-08-21 PROCEDURE — 36415 COLL VENOUS BLD VENIPUNCTURE: CPT

## 2019-08-21 PROCEDURE — 87210 SMEAR WET MOUNT SALINE/INK: CPT

## 2019-08-21 PROCEDURE — 86592 SYPHILIS TEST NON-TREP QUAL: CPT

## 2019-08-21 PROCEDURE — 87491 CHLMYD TRACH DNA AMP PROBE: CPT
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
## 2019-08-21 NOTE — ED NOTES
The documentation for this period is being entered following the guidelines as defined in the [address] downtime policy by Zoila Islas RN.

## 2019-11-03 ENCOUNTER — HOSPITAL ENCOUNTER (EMERGENCY)
Age: 24
Discharge: LEFT BEFORE DISCHARGE | End: 2019-11-03
Attending: EMERGENCY MEDICINE | Admitting: EMERGENCY MEDICINE
Payer: SELF-PAY

## 2019-11-03 VITALS
HEIGHT: 64 IN | OXYGEN SATURATION: 99 % | DIASTOLIC BLOOD PRESSURE: 69 MMHG | HEART RATE: 134 BPM | TEMPERATURE: 100.1 F | RESPIRATION RATE: 18 BRPM | BODY MASS INDEX: 23.9 KG/M2 | WEIGHT: 140 LBS | SYSTOLIC BLOOD PRESSURE: 116 MMHG

## 2019-11-03 PROCEDURE — 75810000275 HC EMERGENCY DEPT VISIT NO LEVEL OF CARE

## 2019-11-03 NOTE — ED TRIAGE NOTES
Pt reports bilateral ear pain and sore throat for the past 2 days. Pt states it hurts to swallow.   Temp in triage 100.1 oral.

## 2021-07-29 ENCOUNTER — HOSPITAL ENCOUNTER (EMERGENCY)
Age: 26
Discharge: HOME | End: 2021-07-29
Attending: EMERGENCY MEDICINE
Payer: COMMERCIAL

## 2021-07-29 ENCOUNTER — APPOINTMENT (OUTPATIENT)
Dept: CT IMAGING | Age: 26
End: 2021-07-29
Attending: PHYSICIAN ASSISTANT
Payer: COMMERCIAL

## 2021-07-29 VITALS
HEART RATE: 80 BPM | SYSTOLIC BLOOD PRESSURE: 112 MMHG | RESPIRATION RATE: 12 BRPM | TEMPERATURE: 98 F | OXYGEN SATURATION: 98 % | DIASTOLIC BLOOD PRESSURE: 70 MMHG

## 2021-07-29 DIAGNOSIS — R51.9 NONINTRACTABLE HEADACHE, UNSPECIFIED CHRONICITY PATTERN, UNSPECIFIED HEADACHE TYPE: Primary | ICD-10-CM

## 2021-07-29 LAB
ALBUMIN SERPL-MCNC: 3.9 G/DL (ref 3.5–5)
ALBUMIN/GLOB SERPL: 1 {RATIO} (ref 1.1–2.2)
ALP SERPL-CCNC: 87 U/L (ref 45–117)
ALT SERPL-CCNC: 18 U/L (ref 12–78)
ANION GAP SERPL CALC-SCNC: 5 MMOL/L (ref 5–15)
AST SERPL-CCNC: 8 U/L (ref 15–37)
BASOPHILS # BLD: 0 K/UL (ref 0–0.1)
BASOPHILS NFR BLD: 1 % (ref 0–1)
BILIRUB SERPL-MCNC: 0.9 MG/DL (ref 0.2–1)
BUN SERPL-MCNC: 12 MG/DL (ref 6–20)
BUN/CREAT SERPL: 19 (ref 12–20)
CALCIUM SERPL-MCNC: 9.2 MG/DL (ref 8.5–10.1)
CHLORIDE SERPL-SCNC: 106 MMOL/L (ref 97–108)
CO2 SERPL-SCNC: 26 MMOL/L (ref 21–32)
CREAT SERPL-MCNC: 0.64 MG/DL (ref 0.55–1.02)
DIFFERENTIAL METHOD BLD: ABNORMAL
EOSINOPHIL # BLD: 0.3 K/UL (ref 0–0.4)
EOSINOPHIL NFR BLD: 6 % (ref 0–7)
ERYTHROCYTE [DISTWIDTH] IN BLOOD BY AUTOMATED COUNT: 14 % (ref 11.5–14.5)
GLOBULIN SER CALC-MCNC: 4 G/DL (ref 2–4)
GLUCOSE SERPL-MCNC: 88 MG/DL (ref 65–100)
HCT VFR BLD AUTO: 39.2 % (ref 35–47)
HGB BLD-MCNC: 12.6 G/DL (ref 11.5–16)
IMM GRANULOCYTES # BLD AUTO: 0 K/UL (ref 0–0.04)
IMM GRANULOCYTES NFR BLD AUTO: 0 % (ref 0–0.5)
LYMPHOCYTES # BLD: 2.3 K/UL (ref 0.8–3.5)
LYMPHOCYTES NFR BLD: 51 % (ref 12–49)
MCH RBC QN AUTO: 25.8 PG (ref 26–34)
MCHC RBC AUTO-ENTMCNC: 32.1 G/DL (ref 30–36.5)
MCV RBC AUTO: 80.2 FL (ref 80–99)
MONOCYTES # BLD: 0.3 K/UL (ref 0–1)
MONOCYTES NFR BLD: 6 % (ref 5–13)
NEUTS SEG # BLD: 1.7 K/UL (ref 1.8–8)
NEUTS SEG NFR BLD: 36 % (ref 32–75)
NRBC # BLD: 0 K/UL (ref 0–0.01)
NRBC BLD-RTO: 0 PER 100 WBC
PLATELET # BLD AUTO: 360 K/UL (ref 150–400)
PMV BLD AUTO: 10.1 FL (ref 8.9–12.9)
POTASSIUM SERPL-SCNC: 4.2 MMOL/L (ref 3.5–5.1)
PROT SERPL-MCNC: 7.9 G/DL (ref 6.4–8.2)
RBC # BLD AUTO: 4.89 M/UL (ref 3.8–5.2)
SODIUM SERPL-SCNC: 137 MMOL/L (ref 136–145)
TSH SERPL DL<=0.05 MIU/L-ACNC: 0.72 UIU/ML (ref 0.36–3.74)
WBC # BLD AUTO: 4.6 K/UL (ref 3.6–11)

## 2021-07-29 PROCEDURE — 99283 EMERGENCY DEPT VISIT LOW MDM: CPT

## 2021-07-29 PROCEDURE — 96374 THER/PROPH/DIAG INJ IV PUSH: CPT

## 2021-07-29 PROCEDURE — 36415 COLL VENOUS BLD VENIPUNCTURE: CPT

## 2021-07-29 PROCEDURE — 85025 COMPLETE CBC W/AUTO DIFF WBC: CPT

## 2021-07-29 PROCEDURE — 70450 CT HEAD/BRAIN W/O DYE: CPT

## 2021-07-29 PROCEDURE — 74011250636 HC RX REV CODE- 250/636: Performed by: PHYSICIAN ASSISTANT

## 2021-07-29 PROCEDURE — 80053 COMPREHEN METABOLIC PANEL: CPT

## 2021-07-29 PROCEDURE — 84443 ASSAY THYROID STIM HORMONE: CPT

## 2021-07-29 RX ORDER — KETOROLAC TROMETHAMINE 30 MG/ML
30 INJECTION, SOLUTION INTRAMUSCULAR; INTRAVENOUS
Status: DISCONTINUED | OUTPATIENT
Start: 2021-07-29 | End: 2021-07-29

## 2021-07-29 RX ORDER — KETOROLAC TROMETHAMINE 30 MG/ML
15 INJECTION, SOLUTION INTRAMUSCULAR; INTRAVENOUS
Status: COMPLETED | OUTPATIENT
Start: 2021-07-29 | End: 2021-07-29

## 2021-07-29 RX ADMIN — KETOROLAC TROMETHAMINE 15 MG: 30 INJECTION, SOLUTION INTRAMUSCULAR; INTRAVENOUS at 20:02

## 2021-07-29 NOTE — ED PROVIDER NOTES
Date of Service:  7/29/2021    Patient:  Joselin Klein    Chief Complaint:  Headache and Ear Pain       HPI:  Joselin Klein is a 26 y.o.  female who presents for evaluation of headache x 9 days. Started on right side, characterized as pressure/aching/stabbing pain. Radiated to jaw/behind ear. Gradual worsening. No change with activity, coughing/sneezing/position. Started with left ear pain yesterday, that radiates up the head to the temple, down the jaw. Gradual worsening. No change with activity, coughing/sneezing/position. Took tylenol with mild improvement, but then headache returned. Endorses general achiness, fatigue, weight gain over the past couple months. Endorses decrease in hearing. No change in vision, no neck pain. No fever/chills, intermittent chest pain and palpitations, no SOB, no abd pain, no n/v/d, dysuria. Denies chance of pregnancy. Currently breastfeeding (7 month baby)               Past Medical History:   Diagnosis Date    Asthma     Borderline personality disorder ([facility] [address]     Depression     Psychiatric disorder        History reviewed. No pertinent surgical history. History reviewed. No pertinent family history.     Social History     Socioeconomic History    Marital status: SINGLE     Spouse name: Not on file    Number of children: Not on file    Years of education: Not on file    Highest education level: Not on file   Occupational History    Not on file   Tobacco Use    Smoking status: Never Smoker    Smokeless tobacco: Never Used   Vaping Use    Vaping Use: Never used   Substance and Sexual Activity    Alcohol use: Yes     Comment: occasional    Drug use: No    Sexual activity: Not on file   Other Topics Concern    Not on file   Social History Narrative    Not on file     Social Determinants of Health     Financial Resource Strain:     Difficulty of Paying Living Expenses:    Food Insecurity:     Worried About Running Out of Food in the Last Year:  Ran Out of Food in the Last Year:    Transportation Needs:     Lack of Transportation (Medical):  Lack of Transportation (Non-Medical):    Physical Activity:     Days of Exercise per Week:     Minutes of Exercise per Session:    Stress:     Feeling of Stress :    Social Connections:     Frequency of Communication with Friends and Family:     Frequency of Social Gatherings with Friends and Family:     Attends Gnosticist Services:     Active Member of Clubs or Organizations:     Attends Club or Organization Meetings:     Marital Status:    Intimate Partner Violence:     Fear of Current or Ex-Partner:     Emotionally Abused:     Physically Abused:     Sexually Abused: ALLERGIES: Afrin [pseudoephedrine sulfate] and [address]    Review of Systems   Constitutional: Negative for chills and fever. HENT: Negative for trouble swallowing. Eyes: Negative for redness. Respiratory: Negative for shortness of breath. Cardiovascular: Negative for chest pain. Gastrointestinal: Negative for abdominal pain, diarrhea, nausea and vomiting. Genitourinary: Negative for dysuria. Musculoskeletal: Negative for gait problem. Skin: Negative for rash. Neurological: Positive for headaches. Negative for syncope. Vitals:    07/29/21 1703   BP: 110/69   Pulse: 78   Resp: 16   Temp: 97.9 °F (36.6 °C)   SpO2: 99%            Physical Exam  Vitals and nursing note reviewed. Constitutional:       Appearance: Normal appearance. HENT:      Head: Normocephalic and atraumatic. Right Ear: Hearing, tympanic membrane, ear canal and external ear normal.      Left Ear: Hearing, tympanic membrane, ear canal and external ear normal.      Nose: Nose normal.   Eyes:      Extraocular Movements: Extraocular movements intact. Conjunctiva/sclera: Conjunctivae normal.      Pupils: Pupils are equal, round, and reactive to light. Cardiovascular:      Rate and Rhythm: Normal rate and regular rhythm. Pulses: Normal pulses. Radial pulses are 2+ on the right side and 2+ on the left side. Posterior tibial pulses are 2+ on the right side and 2+ on the left side. Heart sounds: Normal heart sounds. Pulmonary:      Effort: Pulmonary effort is normal.      Breath sounds: Normal breath sounds. Abdominal:      General: Abdomen is flat. Bowel sounds are normal.      Palpations: Abdomen is soft. Musculoskeletal:         General: Normal range of motion. Cervical back: Normal range of motion and neck supple. Comments: No midline c/t/l spine TTP   Skin:     General: Skin is warm and dry. Neurological:      General: No focal deficit present. Mental Status: She is alert and oriented to person, place, and time. Mental status is at baseline. Cranial Nerves: Cranial nerves are intact. Sensory: Sensation is intact. Motor: Motor function is intact. Coordination: Coordination is intact. Gait: Gait is intact. Psychiatric:         Mood and Affect: Mood normal.         Behavior: Behavior normal.         Thought Content:  Thought content normal.          MDM  Number of Diagnoses or Management Options  Nonintractable headache, unspecified chronicity pattern, unspecified headache type  Diagnosis management comments: LABS:  Recent Results (from the past 6 hour(s))  -CBC WITH AUTOMATED DIFF  Collection Time: 07/29/21  6:54 PM       Result                      Value             Ref Range           WBC                         4.6               3.6 - 11.0 K*       RBC                         4.89              3.80 - 5.20 *       HGB                         12.6              11.5 - 16.0 *       HCT                         39.2              35.0 - 47.0 %       MCV                         80.2              80.0 - 99.0 *       MCH                         25.8 (L)          26.0 - 34.0 *       MCHC                        32.1              30.0 - 36.5 *       RDW 14.0              11.5 - 14.5 %       PLATELET                    360               150 - 400 K/*       MPV                         10.1              8.9 - 12.9 FL       NRBC                        0.0               0  WBC       ABSOLUTE NRBC               0.00              0.00 - 0.01 *       NEUTROPHILS                 36                32 - 75 %           LYMPHOCYTES                 51 (H)            12 - 49 %           MONOCYTES                   6                 5 - 13 %            EOSINOPHILS                 6                 0 - 7 %             BASOPHILS                   1                 0 - 1 %             IMMATURE GRANULOCYTES       0                 0.0 - 0.5 %         ABS. NEUTROPHILS            1.7 (L)           1.8 - 8.0 K/*       ABS. LYMPHOCYTES            2.3               0.8 - 3.5 K/*       ABS. MONOCYTES              0.3               0.0 - 1.0 K/*       ABS. EOSINOPHILS            0.3               0.0 - 0.4 K/*       ABS. BASOPHILS              0.0               0.0 - 0.1 K/*       ABS. IMM.  GRANS.            0.0               0.00 - 0.04 *       DF                          AUTOMATED                        -METABOLIC PANEL, COMPREHENSIVE  Collection Time: 07/29/21  6:54 PM       Result                      Value             Ref Range           Sodium                      137               136 - 145 mm*       Potassium                   4.2               3.5 - 5.1 mm*       Chloride                    106               97 - 108 mmo*       CO2                         26                21 - 32 mmol*       Anion gap                   5                 5 - 15 mmol/L       Glucose                     88                65 - 100 mg/*       BUN                         12                6 - 20 MG/DL        Creatinine                  0.64              0.55 - 1.02 *       BUN/Creatinine ratio        19                12 - 20             GFR est AA                  >60               >60 ml/min/1*       GFR est non-AA              >60               >60 ml/min/1*       Calcium                     9.2               8.5 - 10.1 M*       Bilirubin, total            0.9               0.2 - 1.0 MG*       ALT (SGPT)                  18                12 - 78 U/L         AST (SGOT)                  8 (L)             15 - 37 U/L         Alk. phosphatase            87                45 - 117 U/L        Protein, total              7.9               6.4 - 8.2 g/*       Albumin                     3.9               3.5 - 5.0 g/*       Globulin                    4.0               2.0 - 4.0 g/*       A-G Ratio                   1.0 (L)           1.1 - 2.2      -TSH 3RD GENERATION  Collection Time: 07/29/21  6:54 PM       Result                      Value             Ref Range           TSH                         0.72              0.36 - 3.74 *     IMAGING:  CT HEAD WO CONT   Final Result    No acute intracranial abnormality. Medications During Visit:  Medications  ketorolac (TORADOL) injection 15 mg (15 mg IntraVENous Given 7/29/21 2002)      DECISION MAKING:  Fidelina Contreras is a 26 y.o. female who comes in as above. Is a 81-year-old female presenting today for a headache, generalized weakness and fatigue. States that this headache started 9 days ago, located by her right ear, radiates up to her head. Gradual onset, no change with exertion, coughing, sneezing, position. States that 2 days ago started having left ear pain that radiates up to her left side of her temple. Also gradual, no change with exertion, coughing, sneezing, physician. Denies neck pain, denies vision changes. Denies numbness, tingling, weakness. Patient also endorses generalized fatigue. Denies fever, chills, chest pain, shortness of breath, abdominal pain, nausea, vomiting, diarrhea, dysuria. Denies chance of pregnancy. Vitals stable, patient in no acute distress. TMs normal, bilaterally.   No midline C/T/L-spine tenderness. No focal neuro deficits. Head CT, CBC, CMP, thyroid are reassuring. Patient given dosage of Toradol. Given list of primary care physicians, please make an appointment within 1 week. Strict ED return precautions discussed. IMPRESSION:  Nonintractable headache, unspecified chronicity pattern, unspecified headache type  (primary encounter diagnosis)    DISPOSITION:  Discharged      There are no discharge medications for this patient. Follow-up Information     Follow up With Specialties Details Why Contact Info    [address], [facility] Go to  If symptoms worsen [address] [place] [address]  [phone]          The patient is asked to follow-up with their primary care provider in the next several days. They are to call tomorrow for an appointment. The patient is asked to return promptly for any increased concerns or worsening of symptoms. They can return to this emergency department or any other emergency department. ED Course as of Jul 29 2009   Thu Jul 29, 2021   0573 Discussed results of head CT with patient, patient has no questions at this time.     [EK]      ED Course User Index  [EK] Nimco Magana PA-C       Procedures

## 2021-07-29 NOTE — ED TRIAGE NOTES
Pt arrives with c/o headache x 9 days to right temple. Yesterday started with shooting pain from left jaw behind left ear. Ear pain has been constant. Pt states she feels her hearing has noticeably decreased over last month. \"Feels like hearing under water\".

## 2021-08-25 ENCOUNTER — VIRTUAL VISIT (OUTPATIENT)
Dept: INTERNAL MEDICINE CLINIC | Age: 26
End: 2021-08-25
Payer: COMMERCIAL

## 2021-08-25 DIAGNOSIS — J30.1 ALLERGIC RHINITIS DUE TO POLLEN, UNSPECIFIED SEASONALITY: ICD-10-CM

## 2021-08-25 DIAGNOSIS — R79.89 ABNORMAL CBC: ICD-10-CM

## 2021-08-25 DIAGNOSIS — R53.83 FATIGUE, UNSPECIFIED TYPE: Primary | ICD-10-CM

## 2021-08-25 DIAGNOSIS — J02.9 SORE THROAT: ICD-10-CM

## 2021-08-25 PROCEDURE — 99204 OFFICE O/P NEW MOD 45 MIN: CPT | Performed by: INTERNAL MEDICINE

## 2021-08-25 RX ORDER — FLUTICASONE PROPIONATE 50 MCG
2 SPRAY, SUSPENSION (ML) NASAL DAILY
Qty: 1 BOTTLE | Refills: 0 | Status: SHIPPED | OUTPATIENT
Start: 2021-08-25

## 2021-08-25 RX ORDER — LEVOCETIRIZINE DIHYDROCHLORIDE 5 MG/1
5 TABLET, FILM COATED ORAL
Qty: 30 TABLET | Refills: 0 | Status: SHIPPED | OUTPATIENT
Start: 2021-08-25

## 2021-08-25 NOTE — PROGRESS NOTES
Health Maintenance Due   Topic Date Due    Hepatitis C Screening  Never done    HPV Age 9Y-34Y (1 - 2-dose series) Never done    COVID-19 Vaccine (1) Never done    DTaP/Tdap/Td series (1 - Tdap) Never done    PAP AKA CERVICAL CYTOLOGY  Never done       No chief complaint on file. 1. Have you been to the ER, urgent care clinic since your last visit? Hospitalized since your last visit? Yes When: 7/29/21  Where: EAR PAIN, HEADACHE  Reason for visit: [facility]    2. Have you seen or consulted any other health care providers outside of the [facility] [place] [address] since your last visit? Include any pap smears or colon screening. No    3) Do you have an Advance Directive on file? no    4) Are you interested in receiving information on Advance Directives? NO      Patient is accompanied by self I have received verbal consent from Martita Allen to discuss any/all medical information while they are present in the room.

## 2021-08-25 NOTE — PROGRESS NOTES
[place] is a 26 y.o. female who was seen by synchronous (real-time) audio-video technology on 8/25/2021 for New Patient, Sore Throat, Establish Care, and Knee Pain        Assessment & Plan:   Diagnoses and all orders for this visit:    1. Fatigue, unspecified type  CBC done last month, was abnormal with high lymphocyte. Will need to rule out mono nucleus is will check,    -     CBC WITH AUTOMATED DIFF  -     TIANA BARR VIRUS AB PANEL    2. Allergic rhinitis due to pollen, unspecified seasonality  -     levocetirizine (XYZAL) 5 mg tablet; Take 1 Tablet by mouth daily as needed for Allergies. -     fluticasone propionate (FLONASE) 50 mcg/actuation nasal spray; 2 Sprays by Both Nostrils route daily. 3. Abnormal CBC  Has  lymphocytosis. We will repeat,  -     CBC WITH AUTOMATED DIFF  -     TIANA BARR VIRUS AB PANEL  -     PATHOLOGIST REVIEW SMEARS    4. Sore throat  Need to gargle with salt water. We will check,  -     TIANA BARR VIRUS AB PANEL        I spent at least 30 minutes on this visit with this new patient. Subjective:     Ms. Tracey Olivier is here to establish care. She has been having sore throat for last several weeks. Sometimes she felt postnasal drip. No fever. She has been extremely fatigued. Valve done in hospital, showed abnormal CBC with lymphocytosis. No family history of lung cancer. She already have 2 children. 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 Prior to Admission medications    Medication Sig Start Date End Date Taking? Authorizing Provider   levocetirizine (XYZAL) 5 mg tablet Take 1 Tablet by mouth daily as needed for Allergies. 8/25/21  Yes Birgit Amaro MD   fluticasone propionate (FLONASE) 50 mcg/actuation nasal spray 2 Sprays by Both Nostrils route daily.  8/25/21  Yes Birgit Amaro MD     Past Medical History:   Diagnosis Date    Asthma     Borderline [facility] disorder ([facility] [address]     Depression     Psychiatric disorder        ROS significant for fatigue and sore throat. Objective:     Patient-Reported Vitals 8/25/2021   Patient-Reported Weight 115lb        Constitutional: [x] Appears well-developed and well-nourished [x] No apparent distress      [] Abnormal -     Mental status: [x] Alert and awake  [x] Oriented to person/place/time [x] Able to follow commands    [] Abnormal -     Eyes:   EOM    [x]  Normal    [] Abnormal -   Sclera  [x]  Normal    [] Abnormal -          Discharge [x]  None visible   [] Abnormal -     HENT: [x] Normocephalic, atraumatic  [] Abnormal -   [x] Mouth/Throat: Mucous membranes are moist    External Ears [x] Normal  [] Abnormal -  Tonsils are not enlarged. Neck: [x] No visualized mass [] Abnormal -     Pulmonary/Chest: [x] Respiratory effort normal   [x] No visualized signs of difficulty breathing or respiratory distress        [] Abnormal -      Musculoskeletal:   [x] Normal gait with no signs of ataxia         [x] Normal range of motion of neck        [] Abnormal -     Neurological:        [x] No Facial Asymmetry (Cranial nerve 7 motor function) (limited exam due to video visit)          [x] No gaze palsy        [] Abnormal -          Skin:        [x] No significant exanthematous lesions or discoloration noted on facial skin         [] Abnormal -            Psychiatric:       [x] Normal Affect [] Abnormal -        [x] No Hallucinations    Other pertinent observable physical exam findings:-        We discussed the expected course, resolution and complications of the diagnosis(es) in detail. Medication risks, benefits, costs, interactions, and alternatives were discussed as indicated. I advised her to contact the office if her condition worsens, changes or fails to improve as anticipated. She expressed understanding with the diagnosis(es) and plan. Arturo Menonain, was evaluated through a synchronous (real-time) audio-video encounter.  The patient (or guardian if applicable) is aware that this is a billable service. Verbal consent to proceed has been obtained within the past 12 months. The visit was conducted pursuant to the emergency declaration under the [address] [place] [address] and the [email or web address] and [employer] Act. Patient identification was verified, and a caregiver was present when appropriate. The patient was located in a state where the provider was credentialed to provide care.       Mauro Lantigua MD

## 2021-09-09 LAB
BASOPHILS # BLD AUTO: 0 X10E3/UL (ref 0–0.2)
BASOPHILS NFR BLD AUTO: 1 %
EBV NA IGG SER IA-ACNC: >600 U/ML (ref 0–17.9)
EBV VCA IGG SER IA-ACNC: >600 U/ML (ref 0–17.9)
EBV VCA IGM SER IA-ACNC: <36 U/ML (ref 0–35.9)
EOSINOPHIL # BLD AUTO: 0.1 X10E3/UL (ref 0–0.4)
EOSINOPHIL NFR BLD AUTO: 3 %
ERYTHROCYTE [DISTWIDTH] IN BLOOD BY AUTOMATED COUNT: 15.1 % (ref 11.7–15.4)
HCT VFR BLD AUTO: 40.2 % (ref 34–46.6)
HGB BLD-MCNC: 13.1 G/DL (ref 11.1–15.9)
IMM GRANULOCYTES # BLD AUTO: 0 X10E3/UL (ref 0–0.1)
IMM GRANULOCYTES NFR BLD AUTO: 0 %
LYMPHOCYTES # BLD AUTO: 1.9 X10E3/UL (ref 0.7–3.1)
LYMPHOCYTES NFR BLD AUTO: 47 %
MCH RBC QN AUTO: 25.8 PG (ref 26.6–33)
MCHC RBC AUTO-ENTMCNC: 32.6 G/DL (ref 31.5–35.7)
MCV RBC AUTO: 79 FL (ref 79–97)
MONOCYTES # BLD AUTO: 0.4 X10E3/UL (ref 0.1–0.9)
MONOCYTES NFR BLD AUTO: 10 %
NEUTROPHILS # BLD AUTO: 1.6 X10E3/UL (ref 1.4–7)
NEUTROPHILS NFR BLD AUTO: 39 %
PATH INTERP BLD-IMP: ABNORMAL
PATH REV BLD -IMP: ABNORMAL
PATHOLOGIST NAME: ABNORMAL
PLATELET # BLD AUTO: 392 X10E3/UL (ref 150–450)
RBC # BLD AUTO: 5.07 X10E6/UL (ref 3.77–5.28)
SERVICE CMNT-IMP: ABNORMAL
WBC # BLD AUTO: 4.1 X10E3/UL (ref 3.4–10.8)

## 2021-09-22 ENCOUNTER — VIRTUAL VISIT (OUTPATIENT)
Dept: INTERNAL MEDICINE CLINIC | Age: 26
End: 2021-09-22
Payer: COMMERCIAL

## 2021-09-22 DIAGNOSIS — B27.90 INFECTIOUS MONONUCLEOSIS WITHOUT COMPLICATION, INFECTIOUS MONONUCLEOSIS DUE TO UNSPECIFIED ORGANISM: ICD-10-CM

## 2021-09-22 DIAGNOSIS — R05.9 COUGH: ICD-10-CM

## 2021-09-22 DIAGNOSIS — B34.9 VIRAL INFECTION: Primary | ICD-10-CM

## 2021-09-22 PROCEDURE — 99213 OFFICE O/P EST LOW 20 MIN: CPT | Performed by: INTERNAL MEDICINE

## 2021-09-22 NOTE — PROGRESS NOTES
Martita Allen is a 26 y.o. female who was seen by synchronous (real-time) audio-video technology on 9/22/2021 for Cold, Fatigue, and Other (f/u)        Assessment & Plan:   Diagnoses and all orders for this visit:    1. Viral infection    Need to drink more fluid and rest.  No need for antibiotic. Need to take cough syrup as needed. 2. Infectious mononucleosis without complication, infectious mononucleosis due to unspecified organism  End of July she probably had mononucleosis. Lab work reflected that she had lymphocytosis then. And recent mono screen showed as she has IgG positive. She was very fatigued. Upon asking she mention her boyfriend also had mono at that time. she is feeling better now. Advised to drink more fluid and rest.  3. Cough  No need for antibiotic. Advised to take a cough syrup. I spent at least 25 minutes on this visit with this established patient. Subjective:   Ms. Judy Burleson is here for follow-up. She has been under the weather for last 2 days. Her son has viral infection. He was admitted to hospitals for adenovirus. No Covid exposure. Has mild cough, no shortness of breath or wheezing. Cough has improved since yesterday. Recent lab work done, showed she had mono probably end of July. Upon asking she mentioned her spouse had mono at that time. Labs discussed with her. She did not take Covid vaccine yet. She is thinking about it. I have advised her to take Covid vaccine. Prior to Admission medications    Medication Sig Start Date End Date Taking? Authorizing Provider   levocetirizine (XYZAL) 5 mg tablet Take 1 Tablet by mouth daily as needed for Allergies. 8/25/21  Yes Mario Augustin MD   fluticasone propionate (FLONASE) 50 mcg/actuation nasal spray 2 Sprays by Both Nostrils route daily.  8/25/21  Yes Mario Augustin MD     Past Medical History:   Diagnosis Date    Asthma     Borderline personality disorder ([facility] [address]     Depression     Psychiatric disorder        ROS significant for nasal congestion and cough. Objective:     Patient-Reported Vitals 8/25/2021   Patient-Reported Weight 115lb          Constitutional: [x] Appears well-developed and well-nourished [x] No apparent distress      [] Abnormal -     Mental status: [x] Alert and awake  [x] Oriented to person/place/time [x] Able to follow commands    [] Abnormal -    -     HENT: [x] Normocephalic, atraumatic  [] Abnormal -   [x] Mouth/Throat: Mucous membranes are moist  Nasal congestion present. External Ears [x] Normal  [] Abnormal -    Neck: [x] No visualized mass [] Abnormal -     Pulmonary/Chest: [x] Respiratory effort normal   [x] No visualized signs of difficulty breathing or respiratory distress        [] Abnormal -      Musculoskeletal:   [x] Normal gait with no signs of ataxia         [x] Normal range of motion of neck        [] Abnormal -     Neurological:        [x] No Facial Asymmetry (Cranial nerve 7 motor function) (limited exam due to video visit)          [x] No gaze palsy        [] Abnormal -                   Psychiatric:       [x] Normal Affect [] Abnormal -        [x] No Hallucinations    Other pertinent observable physical exam findings:-        We discussed the expected course, resolution and complications of the diagnosis(es) in detail. Medication risks, benefits, costs, interactions, and alternatives were discussed as indicated. I advised her to contact the office if her condition worsens, changes or fails to improve as anticipated. She expressed understanding with the diagnosis(es) and plan. Suzi Crooks, was evaluated through a synchronous (real-time) audio-video encounter. The patient (or guardian if applicable) is aware that this is a billable service. Verbal consent to proceed has been obtained within the past 12 months.  The visit was conducted pursuant to the emergency declaration under the [facility] [address] waiver authority and the Coronavirus Preparedness and Response Supplemental Appropriations Act. Patient identification was verified, and a caregiver was present when appropriate. The patient was located in a state where the provider was credentialed to provide care.       Merline Hoehn, MD

## 2021-09-22 NOTE — PROGRESS NOTES
Health Maintenance Due   Topic Date Due    Hepatitis C Screening  Never done    HPV Age 9Y-34Y (1 - 2-dose series) Never done    COVID-19 Vaccine (1) Never done    DTaP/Tdap/Td series (1 - Tdap) Never done    Pap Smear  Never done    Flu Vaccine (1) 09/01/2021       No chief complaint on file. 1. Have you been to the ER, urgent care clinic since your last visit? Hospitalized since your last visit? No    2. Have you seen or consulted any other health care providers outside of the [address] [place] [address] since your last visit? Include any pap smears or colon screening. No    3) Do you have an Advance Directive on file? no    4) Are you interested in receiving information on Advance Directives? NO      Patient is accompanied by self I have received verbal consent from Deana Soler to discuss any/all medical information while they are present in the room.

## 2021-09-23 ENCOUNTER — HOSPITAL ENCOUNTER (EMERGENCY)
Age: 26
Discharge: HOME | End: 2021-09-24
Attending: EMERGENCY MEDICINE | Admitting: EMERGENCY MEDICINE
Payer: COMMERCIAL

## 2021-09-23 VITALS
BODY MASS INDEX: 25.61 KG/M2 | WEIGHT: 150 LBS | RESPIRATION RATE: 18 BRPM | OXYGEN SATURATION: 95 % | HEART RATE: 93 BPM | TEMPERATURE: 99 F | DIASTOLIC BLOOD PRESSURE: 78 MMHG | HEIGHT: 64 IN | SYSTOLIC BLOOD PRESSURE: 120 MMHG

## 2021-09-23 DIAGNOSIS — R11.10 VOMITING, INTRACTABILITY OF VOMITING NOT SPECIFIED, PRESENCE OF NAUSEA NOT SPECIFIED, UNSPECIFIED VOMITING TYPE: Primary | ICD-10-CM

## 2021-09-23 DIAGNOSIS — R19.7 DIARRHEA, UNSPECIFIED TYPE: ICD-10-CM

## 2021-09-23 LAB
COMMENT, HOLDF: NORMAL
SAMPLES BEING HELD,HOLD: NORMAL

## 2021-09-23 PROCEDURE — 80053 COMPREHEN METABOLIC PANEL: CPT

## 2021-09-23 PROCEDURE — 74011250636 HC RX REV CODE- 250/636: Performed by: NURSE PRACTITIONER

## 2021-09-23 PROCEDURE — 36415 COLL VENOUS BLD VENIPUNCTURE: CPT

## 2021-09-23 PROCEDURE — 83690 ASSAY OF LIPASE: CPT

## 2021-09-23 PROCEDURE — 93005 ELECTROCARDIOGRAM TRACING: CPT

## 2021-09-23 PROCEDURE — 96374 THER/PROPH/DIAG INJ IV PUSH: CPT

## 2021-09-23 PROCEDURE — 99284 EMERGENCY DEPT VISIT MOD MDM: CPT

## 2021-09-23 PROCEDURE — 85025 COMPLETE CBC W/AUTO DIFF WBC: CPT

## 2021-09-23 RX ORDER — ONDANSETRON 2 MG/ML
4 INJECTION INTRAMUSCULAR; INTRAVENOUS
Status: COMPLETED | OUTPATIENT
Start: 2021-09-23 | End: 2021-09-23

## 2021-09-23 RX ADMIN — SODIUM CHLORIDE 1000 ML: 9 INJECTION, SOLUTION INTRAVENOUS at 23:33

## 2021-09-23 RX ADMIN — ONDANSETRON 4 MG: 2 INJECTION INTRAMUSCULAR; INTRAVENOUS at 23:32

## 2021-09-24 LAB
ALBUMIN SERPL-MCNC: 3.9 G/DL (ref 3.5–5)
ALBUMIN/GLOB SERPL: 0.9 {RATIO} (ref 1.1–2.2)
ALP SERPL-CCNC: 87 U/L (ref 45–117)
ALT SERPL-CCNC: 18 U/L (ref 12–78)
ANION GAP SERPL CALC-SCNC: 5 MMOL/L (ref 5–15)
APPEARANCE UR: CLEAR
AST SERPL-CCNC: 13 U/L (ref 15–37)
ATRIAL RATE: 100 BPM
BACTERIA URNS QL MICRO: NEGATIVE /HPF
BASOPHILS # BLD: 0 K/UL (ref 0–0.1)
BASOPHILS NFR BLD: 0 % (ref 0–1)
BILIRUB SERPL-MCNC: 1.4 MG/DL (ref 0.2–1)
BILIRUB UR QL: NEGATIVE
BUN SERPL-MCNC: 10 MG/DL (ref 6–20)
BUN/CREAT SERPL: 10 (ref 12–20)
CALCIUM SERPL-MCNC: 9.1 MG/DL (ref 8.5–10.1)
CALCULATED P AXIS, ECG09: 75 DEGREES
CALCULATED R AXIS, ECG10: 76 DEGREES
CALCULATED T AXIS, ECG11: 31 DEGREES
CHLORIDE SERPL-SCNC: 106 MMOL/L (ref 97–108)
CO2 SERPL-SCNC: 26 MMOL/L (ref 21–32)
COLOR UR: ABNORMAL
CREAT SERPL-MCNC: 1.02 MG/DL (ref 0.55–1.02)
DIAGNOSIS, 93000: NORMAL
DIFFERENTIAL METHOD BLD: ABNORMAL
EOSINOPHIL # BLD: 0 K/UL (ref 0–0.4)
EOSINOPHIL NFR BLD: 0 % (ref 0–7)
EPITH CASTS URNS QL MICRO: ABNORMAL /LPF
ERYTHROCYTE [DISTWIDTH] IN BLOOD BY AUTOMATED COUNT: 13.8 % (ref 11.5–14.5)
GLOBULIN SER CALC-MCNC: 4.4 G/DL (ref 2–4)
GLUCOSE SERPL-MCNC: 104 MG/DL (ref 65–100)
GLUCOSE UR STRIP.AUTO-MCNC: NEGATIVE MG/DL
HCT VFR BLD AUTO: 41.4 % (ref 35–47)
HGB BLD-MCNC: 13.2 G/DL (ref 11.5–16)
HGB UR QL STRIP: NEGATIVE
HYALINE CASTS URNS QL MICRO: ABNORMAL /LPF (ref 0–5)
IMM GRANULOCYTES # BLD AUTO: 0 K/UL (ref 0–0.04)
IMM GRANULOCYTES NFR BLD AUTO: 0 % (ref 0–0.5)
KETONES UR QL STRIP.AUTO: 80 MG/DL
LEUKOCYTE ESTERASE UR QL STRIP.AUTO: NEGATIVE
LIPASE SERPL-CCNC: 174 U/L (ref 73–393)
LYMPHOCYTES # BLD: 0.5 K/UL (ref 0.8–3.5)
LYMPHOCYTES NFR BLD: 5 % (ref 12–49)
MCH RBC QN AUTO: 25.4 PG (ref 26–34)
MCHC RBC AUTO-ENTMCNC: 31.9 G/DL (ref 30–36.5)
MCV RBC AUTO: 79.8 FL (ref 80–99)
MONOCYTES # BLD: 0.6 K/UL (ref 0–1)
MONOCYTES NFR BLD: 6 % (ref 5–13)
NEUTS SEG # BLD: 9.2 K/UL (ref 1.8–8)
NEUTS SEG NFR BLD: 89 % (ref 32–75)
NITRITE UR QL STRIP.AUTO: NEGATIVE
NRBC # BLD: 0 K/UL (ref 0–0.01)
NRBC BLD-RTO: 0 PER 100 WBC
P-R INTERVAL, ECG05: 126 MS
PH UR STRIP: 8.5 [PH] (ref 5–8)
PLATELET # BLD AUTO: 367 K/UL (ref 150–400)
PMV BLD AUTO: 10.7 FL (ref 8.9–12.9)
POTASSIUM SERPL-SCNC: 4.8 MMOL/L (ref 3.5–5.1)
PROT SERPL-MCNC: 8.3 G/DL (ref 6.4–8.2)
PROT UR STRIP-MCNC: ABNORMAL MG/DL
Q-T INTERVAL, ECG07: 336 MS
QRS DURATION, ECG06: 66 MS
QTC CALCULATION (BEZET), ECG08: 433 MS
RBC # BLD AUTO: 5.19 M/UL (ref 3.8–5.2)
RBC #/AREA URNS HPF: ABNORMAL /HPF (ref 0–5)
RBC MORPH BLD: ABNORMAL
SARS-COV-2, COV2: NORMAL
SARS-COV-2, XPLCVT: NOT DETECTED
SODIUM SERPL-SCNC: 137 MMOL/L (ref 136–145)
SOURCE, COVRS: NORMAL
SP GR UR REFRACTOMETRY: 1.02 (ref 1–1.03)
UR CULT HOLD, URHOLD: NORMAL
UROBILINOGEN UR QL STRIP.AUTO: 0.2 EU/DL (ref 0.2–1)
VENTRICULAR RATE, ECG03: 100 BPM
WBC # BLD AUTO: 10.3 K/UL (ref 3.6–11)
WBC URNS QL MICRO: ABNORMAL /HPF (ref 0–4)

## 2021-09-24 PROCEDURE — 81001 URINALYSIS AUTO W/SCOPE: CPT

## 2021-09-24 PROCEDURE — U0005 INFEC AGEN DETEC AMPLI PROBE: HCPCS

## 2021-09-24 RX ORDER — ONDANSETRON 4 MG/1
4 TABLET, ORALLY DISINTEGRATING ORAL
Qty: 20 TABLET | Refills: 0 | Status: SHIPPED | OUTPATIENT
Start: 2021-09-24 | End: 2022-06-21 | Stop reason: ALTCHOICE

## 2021-09-24 NOTE — DISCHARGE INSTRUCTIONS
Your COVID test results are pending, please isolate until you receive those results   I would encourage mask wearing if you have to be around other people in the interim   You may use a clear liquid diet and build your diet as tolerated from bland diet back to your normal diet

## 2021-09-24 NOTE — ED PROVIDER NOTES
LIZZETTE Wilson Child is a 26 y.o. female with Hx of depression, anxiety, borderline personality who presents ambulatory to [facility] ED with cc of diarrhea, vomiting. Pt reports nonbloody diarrhea for the last 3d. States that she started to have nonbloody vomiting today all day. Reports that she felt generalized weakness and SOB after vomiting episodes. Notes that she is breastfeeding, and her child is now home but recently admitted w/ rhino/ enterovirus   Child has been tested several times for COVID (-). Pt reports that she has been in and out of hospital several times. Denies fevers, chills, rhinorrhea, CP, LE swelling, congestion, cough, HA, or urinary concerns. PCP: Maddi Ruffin MD    There are no other complaints, changes or physical findings at this time. Past Medical History:   Diagnosis Date    Asthma     Borderline personality disorder ([facility] [address]     Depression     Psychiatric disorder        No past surgical history on file.       Family History:   Problem Relation Age of Onset    No Known Problems Mother     No Known Problems Father     Stroke Maternal Grandfather        Social History     Socioeconomic History    Marital status: SINGLE     Spouse name: Not on file    Number of children: Not on file    Years of education: Not on file    Highest education level: Not on file   Occupational History    Not on file   Tobacco Use    Smoking status: Never Smoker    Smokeless tobacco: Never Used   Vaping Use    Vaping Use: Never used   Substance and Sexual Activity    Alcohol use: Not Currently     Comment: occasional    Drug use: No    Sexual activity: Yes     Partners: Male   Other Topics Concern    Not on file   Social History Narrative    Not on file     Social Determinants of Health     Financial Resource Strain:     Difficulty of Paying Living Expenses:    Food Insecurity:     Worried About Running Out of Food in the Last Year:     [address] in the Last Year: Transportation Needs:     Lack of Transportation (Medical):  Lack of Transportation (Non-Medical):    Physical Activity:     Days of Exercise per Week:     Minutes of Exercise per Session:    Stress:     Feeling of Stress :    Social Connections:     Frequency of Communication with Friends and Family:     Frequency of Social Gatherings with Friends and Family:     Attends Holiness Services:     Active Member of Clubs or Organizations:     Attends Club or Organization Meetings:     Marital Status:    Intimate Partner Violence:     Fear of Current or Ex-Partner:     Emotionally Abused:     Physically Abused:     Sexually Abused: ALLERGIES: Afrin [pseudoephedrine sulfate] and [address]    Review of Systems   Constitutional: Positive for activity change and appetite change. Negative for chills and fever. HENT: Negative for congestion, rhinorrhea, sinus pressure, sneezing and sore throat. Eyes: Negative for pain, discharge and visual disturbance. Respiratory: Positive for cough and shortness of breath. Cardiovascular: Negative for chest pain. Gastrointestinal: Positive for abdominal pain, nausea and vomiting. Negative for diarrhea. Genitourinary: Negative for dysuria, flank pain, frequency and urgency. Musculoskeletal: Negative for arthralgias, back pain, gait problem, joint swelling, myalgias and neck pain. Skin: Negative for color change and rash. Neurological: Positive for weakness. Negative for dizziness, speech difficulty, light-headedness, numbness and headaches. Psychiatric/Behavioral: Negative for agitation, behavioral problems and confusion. All other systems reviewed and are negative. Vitals:    09/23/21 2240 09/23/21 2312   BP: (!) 100/54 120/78   Pulse: (!) 105 93   Resp: 17 18   Temp: 97.7 °F (36.5 °C) 99 °F (37.2 °C)   SpO2: 97% 95%   Weight: 68 kg (150 lb)    Height: 5' 4\" (1.626 m)             Physical Exam  Vitals and nursing note reviewed. Constitutional:       General: She is not in acute distress. Appearance: She is well-developed. HENT:      Head: Normocephalic and atraumatic. Right Ear: External ear normal.      Left Ear: External ear normal.   Eyes:      Conjunctiva/sclera: Conjunctivae normal.      Pupils: Pupils are equal, round, and reactive to light. Cardiovascular:      Rate and Rhythm: Regular rhythm. Tachycardia present. Heart sounds: Normal heart sounds. Pulmonary:      Effort: Pulmonary effort is normal.      Breath sounds: Normal breath sounds. Abdominal:      Palpations: Abdomen is soft. Tenderness: There is no abdominal tenderness. There is no guarding or rebound. Musculoskeletal:         General: Normal range of motion. Cervical back: Normal range of motion and neck supple. Skin:     General: Skin is warm and dry. Neurological:      Mental Status: She is alert and oriented to person, place, and time. Psychiatric:         Behavior: Behavior normal.         Thought Content: Thought content normal.         Judgment: Judgment normal.          MDM  Number of Diagnoses or Management Options  Diarrhea, unspecified type  Vomiting, intractability of vomiting not specified, presence of nausea not specified, unspecified vomiting type  Diagnosis management comments: Ddx: AGE, dehydration, enterovirus     Labs reassuring; COVID pending (given recent hospitalization); tolerating PO w/ improved VS at discharge   She is instructed to push clear fluids, small amounts frequently until improving, then advance diet as tolerated. May use Pedialyte for rehydration. May use BRAT diet. Discussed isolation measures given pending COVID as well as s/sx management, stated understanding. Reasons to return to ED provided, encouarged PCP f/u.         Amount and/or Complexity of Data Reviewed  Clinical lab tests: ordered and reviewed  Tests in the radiology section of CPT®: ordered and reviewed  Review and summarize past medical records: yes           Procedures    LABORATORY TESTS:  Recent Results (from the past 12 hour(s))   EKG, 12 LEAD, INITIAL    Collection Time: 09/23/21 10:52 PM   Result Value Ref Range    Ventricular Rate 100 BPM    Atrial Rate 100 BPM    P-R Interval 126 ms    QRS Duration 66 ms    Q-T Interval 336 ms    QTC Calculation (Bezet) 433 ms    Calculated P Axis 75 degrees    Calculated R Axis 76 degrees    Calculated T Axis 31 degrees    Diagnosis       Normal sinus rhythm  Nonspecific T wave abnormality  Abnormal ECG  No previous ECGs available     CBC WITH AUTOMATED DIFF    Collection Time: 09/23/21 11:09 PM   Result Value Ref Range    WBC 10.3 3.6 - 11.0 K/uL    RBC 5.19 3.80 - 5.20 M/uL    HGB 13.2 11.5 - 16.0 g/dL    HCT 41.4 35.0 - 47.0 %    MCV 79.8 (L) 80.0 - 99.0 FL    MCH 25.4 (L) 26.0 - 34.0 PG    MCHC 31.9 30.0 - 36.5 g/dL    RDW 13.8 11.5 - 14.5 %    PLATELET 854 357 - 430 K/uL    MPV 10.7 8.9 - 12.9 FL    NRBC 0.0 0  WBC    ABSOLUTE NRBC 0.00 0.00 - 0.01 K/uL    NEUTROPHILS 89 (H) 32 - 75 %    LYMPHOCYTES 5 (L) 12 - 49 %    MONOCYTES 6 5 - 13 %    EOSINOPHILS 0 0 - 7 %    BASOPHILS 0 0 - 1 %    IMMATURE GRANULOCYTES 0 0.0 - 0.5 %    ABS. NEUTROPHILS 9.2 (H) 1.8 - 8.0 K/UL    ABS. LYMPHOCYTES 0.5 (L) 0.8 - 3.5 K/UL    ABS. MONOCYTES 0.6 0.0 - 1.0 K/UL    ABS. EOSINOPHILS 0.0 0.0 - 0.4 K/UL    ABS. BASOPHILS 0.0 0.0 - 0.1 K/UL    ABS. IMM.  GRANS. 0.0 0.00 - 0.04 K/UL    DF SMEAR SCANNED      RBC COMMENTS NORMOCYTIC, NORMOCHROMIC     METABOLIC PANEL, COMPREHENSIVE    Collection Time: 09/23/21 11:09 PM   Result Value Ref Range    Sodium 137 136 - 145 mmol/L    Potassium 4.8 3.5 - 5.1 mmol/L    Chloride 106 97 - 108 mmol/L    CO2 26 21 - 32 mmol/L    Anion gap 5 5 - 15 mmol/L    Glucose 104 (H) 65 - 100 mg/dL    BUN 10 6 - 20 MG/DL    Creatinine 1.02 0.55 - 1.02 MG/DL    BUN/Creatinine ratio 10 (L) 12 - 20      GFR est AA >60 >60 ml/min/1.73m2    GFR est non-AA >60 >60 ml/min/1.73m2    Calcium 9.1 8.5 - 10.1 MG/DL    Bilirubin, total 1.4 (H) 0.2 - 1.0 MG/DL    ALT (SGPT) 18 12 - 78 U/L    AST (SGOT) 13 (L) 15 - 37 U/L    Alk. phosphatase 87 45 - 117 U/L    Protein, total 8.3 (H) 6.4 - 8.2 g/dL    Albumin 3.9 3.5 - 5.0 g/dL    Globulin 4.4 (H) 2.0 - 4.0 g/dL    A-G Ratio 0.9 (L) 1.1 - 2.2     SAMPLES BEING HELD    Collection Time: 09/23/21 11:09 PM   Result Value Ref Range    SAMPLES BEING HELD 1red,1blue     COMMENT        Add-on orders for these samples will be processed based on acceptable specimen integrity and analyte stability, which may vary by analyte. LIPASE    Collection Time: 09/23/21 11:09 PM   Result Value Ref Range    Lipase 174 73 - 393 U/L   URINALYSIS W/MICROSCOPIC    Collection Time: 09/24/21  1:03 AM   Result Value Ref Range    Color YELLOW/STRAW      Appearance CLEAR CLEAR      Specific gravity 1.019 1.003 - 1.030      pH (UA) 8.5 (H) 5.0 - 8.0      Protein TRACE (A) NEG mg/dL    Glucose Negative NEG mg/dL    Ketone 80 (A) NEG mg/dL    Bilirubin Negative NEG      Blood Negative NEG      Urobilinogen 0.2 0.2 - 1.0 EU/dL    Nitrites Negative NEG      Leukocyte Esterase Negative NEG      WBC 0-4 0 - 4 /hpf    RBC 0-5 0 - 5 /hpf    Epithelial cells FEW FEW /lpf    Bacteria Negative NEG /hpf    Hyaline cast 0-2 0 - 5 /lpf   URINE CULTURE HOLD SAMPLE    Collection Time: 09/24/21  1:03 AM    Specimen: Serum; Urine   Result Value Ref Range    Urine culture hold        Urine on hold in Microbiology dept for 2 days. If unpreserved urine is submitted, it cannot be used for addtional testing after 24 hours, recollection will be required.    SARS-COV-2    Collection Time: 09/24/21  1:03 AM   Result Value Ref Range    SARS-CoV-2 Please find results under separate order         IMAGING RESULTS:  No orders to display       MEDICATIONS GIVEN:  Medications   sodium chloride 0.9 % bolus infusion 1,000 mL (0 mL IntraVENous IV Completed 9/24/21 0033)   ondansetron (ZOFRAN) injection 4 mg (4 mg IntraVENous Given 9/23/21 9902)       IMPRESSION:  1. Vomiting, intractability of vomiting not specified, presence of nausea not specified, unspecified vomiting type    2. Diarrhea, unspecified type        PLAN:  1. Discharge Medication List as of 9/24/2021  1:53 AM      START taking these medications    Details   ondansetron (Zofran ODT) 4 mg disintegrating tablet 1 Tablet by SubLINGual route every eight (8) hours as needed for Nausea or Vomiting., Print, Disp-20 Tablet, R-0         CONTINUE these medications which have NOT CHANGED    Details   levocetirizine (XYZAL) 5 mg tablet Take 1 Tablet by mouth daily as needed for Allergies. , Normal, Disp-30 Tablet, R-0      fluticasone propionate (FLONASE) 50 mcg/actuation nasal spray 2 Sprays by Both Nostrils route daily. , Normal, Disp-1 Bottle, R-0           2. Follow-up Information     Follow up With Specialties Details Why Contact Info    Emmy Peoples MD Internal Medicine Schedule an appointment as soon as possible for a visit   [address]  [facility] [address] [address]  [phone]      [address] [facility] [address] [facility] Emergency Medicine Go to  As needed, If symptoms worsen [facility]  [phone]        3.  Return to ED if worse

## 2021-09-24 NOTE — ED NOTES
Pt presents to ED with cc of vomiting and diarrhea since this morning. Pt reports that she has not been able to produce any breast milk. She states her child was just admitted for rhino and enterovirus.   Pt reports SOB and generalized weakness, denies CP    Pt has not been vaccinated for COVID

## 2021-11-02 ENCOUNTER — HOSPITAL ENCOUNTER (EMERGENCY)
Age: 26
Discharge: HOME | End: 2021-11-02
Attending: EMERGENCY MEDICINE
Payer: COMMERCIAL

## 2021-11-02 VITALS
RESPIRATION RATE: 16 BRPM | OXYGEN SATURATION: 98 % | HEART RATE: 76 BPM | TEMPERATURE: 97.6 F | SYSTOLIC BLOOD PRESSURE: 127 MMHG | DIASTOLIC BLOOD PRESSURE: 83 MMHG

## 2021-11-02 DIAGNOSIS — T14.8XXA MUSCLE STRAIN: Primary | ICD-10-CM

## 2021-11-02 DIAGNOSIS — S16.1XXA STRAIN OF NECK MUSCLE, INITIAL ENCOUNTER: ICD-10-CM

## 2021-11-02 PROCEDURE — 96372 THER/PROPH/DIAG INJ SC/IM: CPT

## 2021-11-02 PROCEDURE — 74011250636 HC RX REV CODE- 250/636: Performed by: EMERGENCY MEDICINE

## 2021-11-02 PROCEDURE — 99283 EMERGENCY DEPT VISIT LOW MDM: CPT

## 2021-11-02 PROCEDURE — 74011000250 HC RX REV CODE- 250: Performed by: EMERGENCY MEDICINE

## 2021-11-02 RX ORDER — KETOROLAC TROMETHAMINE 30 MG/ML
30 INJECTION, SOLUTION INTRAMUSCULAR; INTRAVENOUS
Status: COMPLETED | OUTPATIENT
Start: 2021-11-02 | End: 2021-11-02

## 2021-11-02 RX ORDER — LIDOCAINE 4 G/100G
1 PATCH TOPICAL EVERY 24 HOURS
Status: DISCONTINUED | OUTPATIENT
Start: 2021-11-02 | End: 2021-11-02 | Stop reason: HOSPADM

## 2021-11-02 RX ADMIN — KETOROLAC TROMETHAMINE 30 MG: 30 INJECTION, SOLUTION INTRAMUSCULAR; INTRAVENOUS at 03:34

## 2021-11-02 NOTE — ED PROVIDER NOTES
HPI     26 female with a history of borderline personality disorder, asthma, depression, presents the emergency department with neck pain. Patient states she was having sex with her significant other when she developed pain and spasm in the left side of her neck. It hurts to turn her head to the right. Pain radiates to her shoulder. She denies any weakness or numbness. She was in her usual state of health prior to onset of symptoms. She denies any specific movement or injury that triggered the pain. She put a pain cream over the area but it has not helped. She states she has been in her usual state of health and denies any fever chest pain cough shortness of breath. She has not yet been vaccinated for Covid. Past Medical History:   Diagnosis Date    Asthma     Borderline [facility] disorder ([facility] [address]     Depression     Psychiatric disorder        No past surgical history on file.       Family History:   Problem Relation Age of Onset    No Known Problems Mother     No Known Problems Father     Stroke Maternal Grandfather        Social History     Socioeconomic History    Marital status: SINGLE     Spouse name: Not on file    Number of children: Not on file    Years of education: Not on file    Highest education level: Not on file   Occupational History    Not on file   Tobacco Use    Smoking status: Never Smoker    Smokeless tobacco: Never Used   Vaping Use    Vaping Use: Never used   Substance and Sexual Activity    Alcohol use: Not Currently     Comment: occasional    Drug use: No    Sexual activity: Yes     Partners: Male   Other Topics Concern    Not on file   Social History Narrative    Not on file     Social Determinants of Health     Financial Resource Strain:     Difficulty of Paying Living Expenses:    Food Insecurity:     Worried About Running Out of Food in the Last Year:     Ran Out of Food in the Last Year:    Transportation Needs:     Lack of Transportation (Medical):  Lack of Transportation (Non-Medical):    Physical Activity:     Days of Exercise per Week:     Minutes of Exercise per Session:    Stress:     Feeling of Stress :    Social Connections:     Frequency of Communication with Friends and Family:     Frequency of Social Gatherings with Friends and Family:     Attends Anabaptist Services:     Active Member of Clubs or Organizations:     Attends Club or Organization Meetings:     Marital Status:    Intimate Partner Violence:     Fear of Current or Ex-Partner:     Emotionally Abused:     Physically Abused:     Sexually Abused: ALLERGIES: Afrin [pseudoephedrine sulfate] and [address]    Review of Systems   Constitutional: Negative for fever. HENT: Negative for congestion. Eyes: Negative for visual disturbance. Respiratory: Negative for cough and shortness of breath. Cardiovascular: Negative for chest pain. Gastrointestinal: Negative for abdominal pain, nausea and vomiting. Endocrine: Negative for polyuria. Genitourinary: Negative for dysuria. Musculoskeletal: Positive for neck pain. Skin: Negative for rash. Neurological: Negative for headaches. Psychiatric/Behavioral: Negative for dysphoric mood. Vitals:    11/02/21 0305   BP: 127/83   Pulse: 76   Resp: 16   Temp: 97.6 °F (36.4 °C)   SpO2: 98%            Physical Exam  Constitutional:       General: She is not in acute distress. Appearance: She is well-developed. HENT:      Head: Normocephalic and atraumatic. Mouth/Throat:      Pharynx: No oropharyngeal exudate. Eyes:      General: No scleral icterus. Right eye: No discharge. Left eye: No discharge. Pupils: Pupils are equal, round, and reactive to light. Neck:      Vascular: No JVD. Cardiovascular:      Rate and Rhythm: Normal rate and regular rhythm. Heart sounds: Normal heart sounds. No murmur heard. Pulmonary:      Effort: Pulmonary effort is normal. No respiratory distress. Breath sounds: Normal breath sounds. No stridor. No wheezing or rales. Chest:      Chest wall: No tenderness. Abdominal:      General: Bowel sounds are normal. There is no distension. Palpations: Abdomen is soft. There is no mass. Tenderness: There is no abdominal tenderness. There is no guarding or rebound. Musculoskeletal:         General: Normal range of motion. Cervical back: Normal range of motion and neck supple. Pain with movement (tenderness over left trapezius muscle, pain worsened by head rotation to right) present. Skin:     General: Skin is warm and dry. Capillary Refill: Capillary refill takes less than 2 seconds. Findings: No rash. Neurological:      Mental Status: She is oriented to person, place, and time. Psychiatric:         Behavior: Behavior normal.         Thought Content: Thought content normal.         Judgment: Judgment normal.          MDM       Procedures      Reproducible muscular pain. Neuro exam is reassuring. Will treat with Toradol and Lidoderm patch. D/C with Ibuprofen , flexeril and follow up if no improvement, return if worsening.

## 2021-12-23 ENCOUNTER — VIRTUAL VISIT (OUTPATIENT)
Dept: INTERNAL MEDICINE CLINIC | Age: 26
End: 2021-12-23
Payer: COMMERCIAL

## 2021-12-23 DIAGNOSIS — R07.9 CHEST PAIN, UNSPECIFIED TYPE: ICD-10-CM

## 2021-12-23 DIAGNOSIS — Z11.59 NEED FOR HEPATITIS C SCREENING TEST: ICD-10-CM

## 2021-12-23 DIAGNOSIS — R10.30 LOWER ABDOMINAL PAIN: Primary | ICD-10-CM

## 2021-12-23 DIAGNOSIS — R79.89 ABNORMAL CBC: ICD-10-CM

## 2021-12-23 PROCEDURE — 99214 OFFICE O/P EST MOD 30 MIN: CPT | Performed by: INTERNAL MEDICINE

## 2021-12-23 NOTE — PROGRESS NOTES
Health Maintenance Due   Topic Date Due    Hepatitis C Screening  Never done    COVID-19 Vaccine (1) Never done    HPV Age 9Y-34Y (1 - 2-dose series) Never done    Pap Smear  Never done    Flu Vaccine (1) 09/01/2021       Chief Complaint   Patient presents with    Spasms    Chest Pain    Other     Bowel problem     Abdominal Pain       1. Have you been to the ER, urgent care clinic since your last visit? Hospitalized since your last visit? Yes, 9/23/21, 11/02/21, Vomiting w/ Diarrhea ; Muscle neck spasm    2. Have you seen or consulted any other health care providers outside of the [address] [place] [address] since your last visit? Include any pap smears or colon screening. No    3) Do you have an Advance Directive on file? no    4) Are you interested in receiving information on Advance Directives? NO      Patient is accompanied by self I have received verbal consent from Annalisa Penn to discuss any/all medical information while they are present in the room.

## 2021-12-23 NOTE — PROGRESS NOTES
Diego Sawant is a 23 y.o. female who was seen by synchronous (real-time) audio-video technology on 12/23/2021 for Spasms, Chest Pain, Other (Bowel problem ), and Abdominal Pain        Assessment & Plan:   Diagnoses and all orders for this visit:    1. Lower abdominal pain    she has been having lower abdominal pain on and off for last several months. Already has seen GI on one time. Has another appointment soon. She used to be constipated but not constipated anymore. No diarrhea. No nausea vomiting. Will check food allergy test.  -     CBC WITH AUTOMATED DIFF  -     METABOLIC PANEL, COMPREHENSIVE  -     URINALYSIS W/ REFLEX CULTURE  -     FOOD ALLERGY PROFILE    2. Abnormal CBC    We will repeat,  -     CBC WITH AUTOMATED DIFF    3. Need for hepatitis C screening test  -     HEPATITIS C AB    4. Chest pain  Rarely occasional electric shocklike chest pain transmits through diaphragm to chest persist for several seconds. Advised her to take Pepcid to see if abdominal distention catching diaphragm and causing a problem. I spent at least 30 minutes on this visit with this established patient. Subjective:   Ms. Manan Ashford is here for follow-up. Report chronic lower abdominal pain on and off for last several weeks. No pelvic discharge. She has just gynecologist.  No urinary discomfort or urinary frequency. Is awake dull pain in lower abdomen. She had seen GI months. No family history of oncologic bowel disease. Also report very seldom electric shocklike pain transmitted from diaphragm to her chest wall on and off several times in last few months. No shortness of breath no cough associated with it. She has lot of gas and acidity. She has Pepcid which she is not taking regularly. Has anxiety and depression, taking Prozac, seen in daily planet. Annemarie Davison Her GI is from [address].    Prior to Admission medications    Medication Sig Start Date End Date Taking?  Authorizing Provider   levocetirizine (XYZAL) 5 mg tablet Take 1 Tablet by mouth daily as needed for Allergies. 8/25/21  Yes Alex Bianchi MD   fluticasone propionate (FLONASE) 50 mcg/actuation nasal spray 2 Sprays by Both Nostrils route daily. 8/25/21  Yes Alex Bianchi MD   ondansetron (Zofran ODT) 4 mg disintegrating tablet 1 Tablet by SubLINGual route every eight (8) hours as needed for Nausea or Vomiting. Patient not taking: Reported on 11/2/2021 9/24/21   Goldie Gill NP     Past Medical History:   Diagnosis Date    Asthma     Borderline personality disorder ([facility] [address]     Depression     Psychiatric disorder        ROS begin for lower abdominal pain and chest pain. Objective:     Patient-Reported Vitals 12/23/2021   Patient-Reported Weight 145 lbs   Patient-Reported Pulse 80   Patient-Reported Systolic  968   Patient-Reported Diastolic 89   Patient-Reported LMP 11/26/21          Constitutional: [x] Appears well-developed and well-nourished [x] No apparent distress      [] Abnormal -     Mental status: [x] Alert and awake  [x] Oriented to person/place/time [x] Able to follow commands    [] Abnormal -       HENT: [x] Normocephalic, atraumatic  [] Abnormal -   [x] Mouth/Throat: Mucous membranes are moist    External Ears [x] Normal  [] Abnormal -    Neck: [x] No visualized mass [] Abnormal -     Pulmonary/Chest: [x] Respiratory effort normal   [x] No visualized signs of difficulty breathing or respiratory distress        [] Abnormal -   Abdomen: Mild distention and nontender abdomen right now.   Musculoskeletal:   [x] Normal gait with no signs of ataxia         [x] Normal range of motion of neck        [] Abnormal -     Neurological:        [x] No Facial Asymmetry (Cranial nerve 7 motor function) (limited exam due to video visit)          [x] No gaze palsy        [] Abnormal -          Skin:        [x] No significant exanthematous lesions or discoloration noted on facial skin         [] Abnormal -            Psychiatric:       [x] Normal Affect [] Abnormal -        [x] No Hallucinations    Other pertinent observable physical exam findings:-        We discussed the expected course, resolution and complications of the diagnosis(es) in detail. Medication risks, benefits, costs, interactions, and alternatives were discussed as indicated. I advised her to contact the office if her condition worsens, changes or fails to improve as anticipated. She expressed understanding with the diagnosis(es) and plan. Rupesh Lance, was evaluated through a synchronous (real-time) audio-video encounter. The patient (or guardian if applicable) is aware that this is a billable service. Verbal consent to proceed has been obtained within the past 12 months. The visit was conducted pursuant to the emergency declaration under the [address] [place] [address] authority and the [employer] General Act. Patient identification was verified, and a caregiver was present when appropriate. The patient was located in a state where the provider was credentialed to provide care.       Kevin Evans MD

## 2022-01-07 ENCOUNTER — APPOINTMENT (OUTPATIENT)
Dept: GENERAL RADIOLOGY | Age: 27
End: 2022-01-07
Attending: PHYSICIAN ASSISTANT
Payer: COMMERCIAL

## 2022-01-07 ENCOUNTER — HOSPITAL ENCOUNTER (EMERGENCY)
Age: 27
Discharge: HOME | End: 2022-01-07
Attending: EMERGENCY MEDICINE | Admitting: EMERGENCY MEDICINE
Payer: COMMERCIAL

## 2022-01-07 VITALS
TEMPERATURE: 98 F | HEART RATE: 97 BPM | OXYGEN SATURATION: 99 % | SYSTOLIC BLOOD PRESSURE: 100 MMHG | RESPIRATION RATE: 16 BRPM | DIASTOLIC BLOOD PRESSURE: 62 MMHG

## 2022-01-07 DIAGNOSIS — R06.02 SOB (SHORTNESS OF BREATH): ICD-10-CM

## 2022-01-07 DIAGNOSIS — F41.1 ANXIETY STATE: ICD-10-CM

## 2022-01-07 DIAGNOSIS — R07.89 CHEST TIGHTNESS: Primary | ICD-10-CM

## 2022-01-07 LAB
ALBUMIN SERPL-MCNC: 3.7 G/DL (ref 3.5–5)
ALBUMIN/GLOB SERPL: 0.9 {RATIO} (ref 1.1–2.2)
ALP SERPL-CCNC: 68 U/L (ref 45–117)
ALT SERPL-CCNC: 21 U/L (ref 12–78)
ANION GAP SERPL CALC-SCNC: 9 MMOL/L (ref 5–15)
AST SERPL-CCNC: 6 U/L (ref 15–37)
ATRIAL RATE: 115 BPM
BASOPHILS # BLD: 0 K/UL (ref 0–0.1)
BASOPHILS NFR BLD: 1 % (ref 0–1)
BILIRUB SERPL-MCNC: 0.6 MG/DL (ref 0.2–1)
BUN SERPL-MCNC: 11 MG/DL (ref 6–20)
BUN/CREAT SERPL: 12 (ref 12–20)
CALCIUM SERPL-MCNC: 9.3 MG/DL (ref 8.5–10.1)
CALCULATED P AXIS, ECG09: 44 DEGREES
CALCULATED R AXIS, ECG10: 25 DEGREES
CALCULATED T AXIS, ECG11: -38 DEGREES
CHLORIDE SERPL-SCNC: 105 MMOL/L (ref 97–108)
CO2 SERPL-SCNC: 24 MMOL/L (ref 21–32)
COMMENT, HOLDF: NORMAL
CREAT SERPL-MCNC: 0.94 MG/DL (ref 0.55–1.02)
DIAGNOSIS, 93000: NORMAL
DIFFERENTIAL METHOD BLD: ABNORMAL
EOSINOPHIL # BLD: 0.4 K/UL (ref 0–0.4)
EOSINOPHIL NFR BLD: 8 % (ref 0–7)
ERYTHROCYTE [DISTWIDTH] IN BLOOD BY AUTOMATED COUNT: 15.3 % (ref 11.5–14.5)
GLOBULIN SER CALC-MCNC: 4.1 G/DL (ref 2–4)
GLUCOSE SERPL-MCNC: 147 MG/DL (ref 65–100)
HCT VFR BLD AUTO: 36.9 % (ref 35–47)
HGB BLD-MCNC: 11.8 G/DL (ref 11.5–16)
IMM GRANULOCYTES # BLD AUTO: 0 K/UL (ref 0–0.04)
IMM GRANULOCYTES NFR BLD AUTO: 0 % (ref 0–0.5)
LYMPHOCYTES # BLD: 2.1 K/UL (ref 0.8–3.5)
LYMPHOCYTES NFR BLD: 41 % (ref 12–49)
MCH RBC QN AUTO: 24.8 PG (ref 26–34)
MCHC RBC AUTO-ENTMCNC: 32 G/DL (ref 30–36.5)
MCV RBC AUTO: 77.5 FL (ref 80–99)
MONOCYTES # BLD: 0.4 K/UL (ref 0–1)
MONOCYTES NFR BLD: 9 % (ref 5–13)
NEUTS SEG # BLD: 2.2 K/UL (ref 1.8–8)
NEUTS SEG NFR BLD: 41 % (ref 32–75)
NRBC # BLD: 0 K/UL (ref 0–0.01)
NRBC BLD-RTO: 0 PER 100 WBC
P-R INTERVAL, ECG05: 128 MS
PLATELET # BLD AUTO: 443 K/UL (ref 150–400)
PMV BLD AUTO: 10.6 FL (ref 8.9–12.9)
POTASSIUM SERPL-SCNC: 3.7 MMOL/L (ref 3.5–5.1)
PROT SERPL-MCNC: 7.8 G/DL (ref 6.4–8.2)
Q-T INTERVAL, ECG07: 308 MS
QRS DURATION, ECG06: 74 MS
QTC CALCULATION (BEZET), ECG08: 426 MS
RBC # BLD AUTO: 4.76 M/UL (ref 3.8–5.2)
SAMPLES BEING HELD,HOLD: NORMAL
SODIUM SERPL-SCNC: 138 MMOL/L (ref 136–145)
TROPONIN-HIGH SENSITIVITY: 4 NG/L (ref 0–51)
VENTRICULAR RATE, ECG03: 115 BPM
WBC # BLD AUTO: 5.2 K/UL (ref 3.6–11)

## 2022-01-07 PROCEDURE — 85025 COMPLETE CBC W/AUTO DIFF WBC: CPT

## 2022-01-07 PROCEDURE — 36415 COLL VENOUS BLD VENIPUNCTURE: CPT

## 2022-01-07 PROCEDURE — 99283 EMERGENCY DEPT VISIT LOW MDM: CPT

## 2022-01-07 PROCEDURE — 80053 COMPREHEN METABOLIC PANEL: CPT

## 2022-01-07 PROCEDURE — 74011250636 HC RX REV CODE- 250/636: Performed by: PHYSICIAN ASSISTANT

## 2022-01-07 PROCEDURE — 71046 X-RAY EXAM CHEST 2 VIEWS: CPT

## 2022-01-07 PROCEDURE — 93005 ELECTROCARDIOGRAM TRACING: CPT

## 2022-01-07 PROCEDURE — 84484 ASSAY OF TROPONIN QUANT: CPT

## 2022-01-07 RX ADMIN — SODIUM CHLORIDE 1000 ML: 9 INJECTION, SOLUTION INTRAVENOUS at 01:57

## 2022-01-07 NOTE — ED PROVIDER NOTES
23year old female presenting to the ED forCP and anxiety. Notes that tonight she tried a THC infused pesto - ate it between 9 and 10, started to feel bad at about midnight. \"I felt like if I laid down I was going to die, I got up and started pacing, it got worse, my chest was tightening. \"  No chest pain. Seen her PCP about her symptoms, notes that\"It started back when I was pregnant\" - notes symptoms x 1.5 years. Has seen her PCP about these symptoms, reports that she has already had 1 round of blood work drawn but they want to do another. No hemoptysis. Has been having some headaches x a couple of days. Stabbing, intermittent. Chart review indicates patient was seen here over the summer for headaches and had a normal head CT. \"I have to focus to breathe. \" Patient denies hx VTE, recent immobilization, exogenous estrogen use, hemoptysis, unilateral leg pain/swelling. PMHx: asthma, depression, borderline personality disorder  Psx: denies  Social: non-smoker. No alcohol. Past Medical History:   Diagnosis Date    Asthma     Borderline [facility] disorder ([facility] [address]     Depression     Psychiatric disorder        History reviewed. No pertinent surgical history.       Family History:   Problem Relation Age of Onset    No Known Problems Mother     No Known Problems Father     Stroke Maternal Grandfather        Social History     Socioeconomic History    Marital status: SINGLE     Spouse name: Not on file    Number of children: Not on file    Years of education: Not on file    Highest education level: Not on file   Occupational History    Not on file   Tobacco Use    Smoking status: Never Smoker    Smokeless tobacco: Never Used   Vaping Use    Vaping Use: Never used   Substance and Sexual Activity    Alcohol use: Not Currently     Comment: occasional    Drug use: Yes     Types: Marijuana    Sexual activity: Yes     Partners: Male   Other Topics Concern    Not on file   Social History Narrative    Not on file     Social Determinants of Health     Financial Resource Strain:     Difficulty of Paying Living Expenses: Not on file   Food Insecurity:     Worried About Running Out of Food in the Last Year: Not on file    Alex of Food in the Last Year: Not on file   Transportation Needs:     Lack of Transportation (Medical): Not on file    Lack of Transportation (Non-Medical): Not on file   Physical Activity:     Days of Exercise per Week: Not on file    Minutes of Exercise per Session: Not on file   Stress:     Feeling of Stress : Not on file   Social Connections:     Frequency of Communication with Friends and Family: Not on file    Frequency of Social Gatherings with Friends and Family: Not on file    Attends Rastafari Services: Not on file    Active Member of [address] [place] [address] [employer] or Organizations: Not on file    Attends Club or Organization Meetings: Not on file    Marital Status: Not on file   Intimate Partner Violence:     Fear of Current or Ex-Partner: Not on file    Emotionally Abused: Not on file    Physically Abused: Not on file    Sexually Abused: Not on file   Housing Stability:     Unable to Pay for Housing in the Last Year: Not on file    Number of [place] in the Last Year: Not on file    Unstable Housing in the Last Year: Not on file         ALLERGIES: Afrin [pseudoephedrine sulfate] and [address]    Review of Systems   Constitutional: Negative for fever. HENT: Negative for facial swelling. Respiratory: Positive for chest tightness and shortness of breath. Cardiovascular: Negative for leg swelling. Gastrointestinal: Negative for vomiting. Skin: Negative for wound. Neurological: Negative for syncope. Psychiatric/Behavioral: The patient is nervous/anxious. All other systems reviewed and are negative.       Vitals:    01/07/22 0059   BP: 115/70   Pulse: (!) 125   Resp: 16   Temp: 97.5 °F (36.4 °C)   SpO2: 98%            Physical Exam  Vitals and nursing note reviewed. Constitutional:       General: She is not in acute distress. Appearance: She is well-developed. Comments: Pleasant, slightly drowsy, no acute distress   HENT:      Head: Normocephalic and atraumatic. Right Ear: External ear normal.      Left Ear: External ear normal.   Eyes:      General: No scleral icterus. Conjunctiva/sclera: Conjunctivae normal.   Neck:      Trachea: No tracheal deviation. Cardiovascular:      Rate and Rhythm: Normal rate and regular rhythm. Heart sounds: Normal heart sounds. No murmur heard. No friction rub. No gallop. Comments: Mildly tachycardic, heart rate around 110 during exam  Pulmonary:      Effort: Pulmonary effort is normal. No respiratory distress. Breath sounds: Normal breath sounds. No stridor. No wheezing. Abdominal:      General: There is no distension. Palpations: Abdomen is soft. Musculoskeletal:         General: Normal range of motion. Cervical back: Neck supple. Skin:     General: Skin is warm and dry. Neurological:      Mental Status: She is alert and oriented to person, place, and time. Psychiatric:         Behavior: Behavior normal.          MDM  Number of Diagnoses or Management Options  Diagnosis management comments: 56-year-old female presenting to the ED for multiple symptoms, mainly chest tightness, feeling like it was hard to breathe, anxiety that occurred this evening. Patient does admit that prior to onset of symptoms she ingested a THC edible product. Nonischemic EKG, overall reassuring labs. Patient feeling better after an observation period and some fluids, discussed possible causes of symptoms, some of the patient's symptoms have actually been going on for well over a year, encouraged her to continue to follow-up with her primary care for these.        Amount and/or Complexity of Data Reviewed  Clinical lab tests: ordered and reviewed  Tests in the radiology section of CPT®: ordered and reviewed  Tests in the medicine section of CPT®: reviewed  Discuss the patient with other providers: yes (Dr. Leeann Ni, ED attending)           Procedures               Patient reassessed, sleeping soundly. When awakened, reports that overall she is feeling significantly better, no pain at this time. Discussed all results, possible causes of symptoms, encourage continue primary care follow-up for the symptoms that she has been having for some time now.   TAMIKO Sam  3:15 AM

## 2022-01-07 NOTE — DISCHARGE INSTRUCTIONS
Return for new or worsening symptoms as discussed. Continue to follow-up with your primary care for ongoing symptoms.

## 2022-01-07 NOTE — ED TRIAGE NOTES
Patient arrives via EMS from home. Patient states tonight she had Marijuana infused pesto on toast. A few hours later she had chest tightness and shortness of breath. Patient states that she has been having generalized malaise x2 weeks. States she's been having \"an impending sense of doom. \" since her pregnancy last year. Endorses a history of anxiety.

## 2022-03-03 ENCOUNTER — HOSPITAL ENCOUNTER (EMERGENCY)
Age: 27
Discharge: HOME | End: 2022-03-03
Attending: EMERGENCY MEDICINE | Admitting: EMERGENCY MEDICINE
Payer: COMMERCIAL

## 2022-03-03 VITALS
HEART RATE: 85 BPM | OXYGEN SATURATION: 100 % | SYSTOLIC BLOOD PRESSURE: 103 MMHG | DIASTOLIC BLOOD PRESSURE: 62 MMHG | TEMPERATURE: 97 F | RESPIRATION RATE: 16 BRPM

## 2022-03-03 DIAGNOSIS — S89.91XA KNEE INJURY, RIGHT, INITIAL ENCOUNTER: Primary | ICD-10-CM

## 2022-03-03 PROCEDURE — 99282 EMERGENCY DEPT VISIT SF MDM: CPT

## 2022-03-03 NOTE — ED PROVIDER NOTES
This is a 52-year-old female who presents to the emergency room with complaints of right knee pain. Patient states yesterday her child pummeled into her right leg causing a deformity. Patient states she had to \"punch\" it back in place. States after she punched her knee in place she was having difficulty ambulating. Patient states she can only mildly weight-bear on the right lower extremity. Her  put tape around her knee to give its stability of which made her knee feel better. Comes to the emergency room today with worsening pain and swelling at the right knee despite the use of the tape. Patient states she is still unable to fully weight-bear on her right lower extremity. States \"I am hopping on my left leg. \"  Has applied ice to the region. States that she is 5 weeks pregnant and realizes she cannot have an x-ray. Patient denies falling, denies any other injury. No pain in her calf. Denies chest pain, shortness of breath, dizziness, nausea or vomiting, fevers or chills. There are no further complaints at this time. Nicholas Herron MD  Past Medical History:  No date: Asthma  No date: Borderline personality disorder ([facility]  No date: Depression  No date: Psychiatric disorder  No past surgical history on file. Past Medical History:   Diagnosis Date    Asthma     [facility]line [facility] disorder ([facility]     Depression     Psychiatric disorder        No past surgical history on file.       Family History:   Problem Relation Age of Onset    No Known Problems Mother     No Known Problems Father     Stroke Maternal Grandfather        Social History     Socioeconomic History    Marital status: SINGLE     Spouse name: Not on file    Number of children: Not on file    Years of education: Not on file    Highest education level: Not on file   Occupational History    Not on file   Tobacco Use    Smoking status: Never Smoker    Smokeless tobacco: Never Used   Vaping Use    Vaping Use: Never used   Substance and Sexual Activity    Alcohol use: Not Currently     Comment: occasional    Drug use: Yes     Types: Marijuana    Sexual activity: Yes     Partners: Male   Other Topics Concern    Not on file   Social History Narrative    Not on file     Social Determinants of Health     Financial Resource Strain:     Difficulty of Paying Living Expenses: Not on file   Food Insecurity:     Worried About Running Out of Food in the Last Year: Not on file    Alex of Food in the Last Year: Not on file   Transportation Needs:     Lack of Transportation (Medical): Not on file    Lack of Transportation (Non-Medical): Not on file   Physical Activity:     Days of Exercise per Week: Not on file    Minutes of Exercise per Session: Not on file   Stress:     Feeling of Stress : Not on file   Social Connections:     Frequency of Communication with Friends and Family: Not on file    Frequency of Social Gatherings with Friends and Family: Not on file    Attends Muslim Services: Not on file    Active Member of [address] [place] [address] or Organizations: Not on file    Attends Club or Organization Meetings: Not on file    Marital Status: Not on file   Intimate Partner Violence:     Fear of Current or Ex-Partner: Not on file    Emotionally Abused: Not on file    Physically Abused: Not on file    Sexually Abused: Not on file   Housing Stability:     Unable to Pay for Housing in the Last Year: Not on file    Number of [place] in the Last Year: Not on file    Unstable Housing in the Last Year: Not on file         ALLERGIES: Afrin [pseudoephedrine sulfate] and [address]    Review of Systems   Constitutional: Negative for appetite change and fever. HENT: Negative for congestion and trouble swallowing. Eyes: Negative for redness. Respiratory: Negative for chest tightness and shortness of breath. Cardiovascular: Negative for chest pain. Gastrointestinal: Negative for abdominal distention, nausea and vomiting. Endocrine: Negative. Genitourinary: Negative for difficulty urinating. Musculoskeletal: Positive for arthralgias (right knee). Negative for back pain. Skin: Negative for color change, pallor, rash and wound. Allergic/Immunologic: Negative. Neurological: Negative for weakness. Hematological: Does not bruise/bleed easily. Psychiatric/Behavioral: Negative for behavioral problems. The patient is not nervous/anxious. Vitals:    03/03/22 1104   BP: 103/62   Pulse: 85   Resp: 16   Temp: 97 °F (36.1 °C)   SpO2: 100%            Physical Exam  Vitals and nursing note reviewed. Constitutional:       General: She is not in acute distress. Appearance: Normal appearance. She is well-developed. She is not ill-appearing. HENT:      Head: Normocephalic and atraumatic. Right Ear: External ear normal.      Left Ear: External ear normal.      Nose: Nose normal.      Mouth/Throat:      Mouth: Mucous membranes are moist.   Eyes:      General:         Right eye: No discharge. Left eye: No discharge. Conjunctiva/sclera: Conjunctivae normal.      Pupils: Pupils are equal, round, and reactive to light. Neck:      Vascular: No JVD. Trachea: No tracheal deviation. Cardiovascular:      Rate and Rhythm: Normal rate and regular rhythm. Pulses: Normal pulses. Heart sounds: Normal heart sounds. No murmur heard. No gallop. Pulmonary:      Effort: Pulmonary effort is normal. No respiratory distress. Breath sounds: Normal breath sounds. No wheezing or rales. Chest:      Chest wall: No tenderness. Abdominal:      General: Bowel sounds are normal. There is no distension. Palpations: Abdomen is soft. Tenderness: There is no abdominal tenderness. There is no guarding or rebound. Musculoskeletal:         General: Tenderness (right knee) present. Normal range of motion. Cervical back: Normal range of motion and neck supple.    Skin:     General: Skin is warm and dry. Capillary Refill: Capillary refill takes less than 2 seconds. Coloration: Skin is not pale. Findings: No erythema or rash. Neurological:      General: No focal deficit present. Mental Status: She is alert and oriented to person, place, and time. Motor: No weakness. Coordination: Coordination normal.   Psychiatric:         Mood and Affect: Mood normal.         Behavior: Behavior normal.         Thought Content: Thought content normal.         Judgment: Judgment normal.          MDM  Number of Diagnoses or Management Options  Knee injury, right, initial encounter: new and does not require workup  Diagnosis management comments: Differential diagnosis includes dislocated patella, musculoskeletal pain, ligament injury and others. After physical examination, patient was placed in an Ace wrap and a knee immobilizer and discharged home. Will follow up with PCP as well as orthopedics as an outpatient. Rest, ice, elevate. Patient in agreement with plan of care. Labs Reviewed - No data to display  No results found. 11:52 AM  Pt has been reexamined. Pt has no new complaints, changes or physical findings. Care plan outlined and precautions discussed. All available results were reviewed with pt. All medications were reviewed with pt. All of pt's questions and concerns were addressed. Pt agrees to F/U as instructed and agrees to return to ED upon further deterioration. Pt is ready to go home. Ruth Andrews NP    Please note that this dictation was completed with [employer], the [employer] voice recognition software. Quite often unanticipated grammatical, syntax, homophones, and other interpretive errors are inadvertently transcribed by the computer software. Please disregard these errors. Please excuse any errors that have escaped final proofreading. Thank you.     Procedures

## 2022-03-03 NOTE — ED TRIAGE NOTES
Pt stated she \"dislocated\" her right knee yesterday  , her child ran into it and she \"punched\" it back in last night, pt ambulatory with limp to triage, pt is about 5 weeks pregnant

## 2022-05-05 ENCOUNTER — HOSPITAL ENCOUNTER (EMERGENCY)
Age: 27
Discharge: HOME | End: 2022-05-05
Attending: EMERGENCY MEDICINE
Payer: COMMERCIAL

## 2022-05-05 VITALS
OXYGEN SATURATION: 97 % | RESPIRATION RATE: 16 BRPM | SYSTOLIC BLOOD PRESSURE: 107 MMHG | DIASTOLIC BLOOD PRESSURE: 67 MMHG | TEMPERATURE: 97.3 F | HEART RATE: 85 BPM

## 2022-05-05 DIAGNOSIS — R51.9 NONINTRACTABLE HEADACHE, UNSPECIFIED CHRONICITY PATTERN, UNSPECIFIED HEADACHE TYPE: Primary | ICD-10-CM

## 2022-05-05 PROCEDURE — 99283 EMERGENCY DEPT VISIT LOW MDM: CPT

## 2022-05-05 RX ORDER — ONDANSETRON 4 MG/1
4 TABLET, FILM COATED ORAL
Qty: 15 TABLET | Refills: 0 | Status: SHIPPED | OUTPATIENT
Start: 2022-05-05 | End: 2022-06-21 | Stop reason: ALTCHOICE

## 2022-05-05 NOTE — ED TRIAGE NOTES
Pt stated she kept getting head butted by her baby who was in the playpen with her last night, pt c/o headache, nausea, pt is also 14 weeks pregnant

## 2022-05-05 NOTE — ED PROVIDER NOTES
HPI patient is a 56-year-old pregnant female with past medical history significant for asthma, depression and borderline personality disorder presents to the ED with a headache. She states that she slept with her toddler in his [de-identified] yesterday and awoke with a severe headache this morning. Denies fever, cold symptoms, neck pain, visual changes, focal weakness or rash. Hand eye coordination is intact, normal reflexes, normal gait. She has not had any medications today prior to arrival.  She states that she is taking her prenatal vitamins as ordered. Patient is receiving her prenatal care at the [address]. Past Medical History:   Diagnosis Date    Asthma     [facility]line [facility] ([facility]     Depression     Psychiatric disorder        No past surgical history on file. Family History:   Problem Relation Age of Onset    No Known Problems Mother     No Known Problems Father     Stroke Maternal Grandfather        Social History     Socioeconomic History    Marital status: SINGLE     Spouse name: Not on file    Number of children: Not on file    Years of education: Not on file    Highest education level: Not on file   Occupational History    Not on file   Tobacco Use    Smoking status: Never Smoker    Smokeless tobacco: Never Used   Vaping Use    Vaping Use: Never used   Substance and Sexual Activity    Alcohol use: Not Currently     Comment: occasional    Drug use: Yes     Types: Marijuana    Sexual activity: Yes     Partners: Male   Other Topics Concern    Not on file   Social History Narrative    Not on file     Social Determinants of Health     Financial Resource Strain:     Difficulty of Paying Living Expenses: Not on file   Food Insecurity:     Worried About Running Out of Food in the Last Year: Not on file    Alex of Food in the Last Year: Not on file   Transportation Needs:     Lack of Transportation (Medical):  Not on file    Lack of Transportation (Non-Medical): Not on file   Physical Activity:     Days of Exercise per Week: Not on file    Minutes of Exercise per Session: Not on file   Stress:     Feeling of Stress : Not on file   Social Connections:     Frequency of Communication with Friends and Family: Not on file    Frequency of Social Gatherings with Friends and Family: Not on file    Attends Buddhist Services: Not on file    Active Member of [address] [place] [address] or Organizations: Not on file    Attends Club or Organization Meetings: Not on file    Marital Status: Not on file   Intimate Partner Violence:     Fear of Current or Ex-Partner: Not on file    Emotionally Abused: Not on file    Physically Abused: Not on file    Sexually Abused: Not on file   Housing Stability:     Unable to Pay for Housing in the Last Year: Not on file    Number of [place] in the Last Year: Not on file    Unstable Housing in the Last Year: Not on file         ALLERGIES: Afrin [pseudoephedrine sulfate] and [address]    Review of Systems   Constitutional: Negative for activity change, appetite change, fever and unexpected weight change. HENT: Negative for congestion, sore throat and trouble swallowing. Eyes: Negative for visual disturbance. Respiratory: Negative for cough, shortness of breath and wheezing. Cardiovascular: Negative for chest pain, palpitations and leg swelling. Gastrointestinal: Positive for nausea. Negative for abdominal pain, diarrhea and vomiting. Genitourinary: Negative for dysuria. Musculoskeletal: Negative for back pain and neck pain. Neurological: Positive for headaches. Negative for dizziness and light-headedness. All other systems reviewed and are negative. Vitals:    05/05/22 1401   BP: 107/67   Pulse: 85   Resp: 16   Temp: 97.3 °F (36.3 °C)   SpO2: 97%            Physical Exam  Vitals and nursing note reviewed. Constitutional:       General: She is not in acute distress. Appearance: Normal appearance.  She is normal weight. She is not ill-appearing, toxic-appearing or diaphoretic. Comments: Pregnant female, non-smoker,A1   HENT:      Head: Normocephalic. Mouth/Throat:      Mouth: Mucous membranes are moist.      Pharynx: No posterior oropharyngeal erythema. Eyes:      Extraocular Movements: Extraocular movements intact. Conjunctiva/sclera: Conjunctivae normal.      Pupils: Pupils are equal, round, and reactive to light. Cardiovascular:      Rate and Rhythm: Normal rate. Pulmonary:      Effort: Pulmonary effort is normal.      Breath sounds: Normal breath sounds. Abdominal:      Palpations: Abdomen is soft. Tenderness: There is no abdominal tenderness. Musculoskeletal:         General: Normal range of motion. Cervical back: Normal range of motion and neck supple. Right lower leg: No edema. Left lower leg: No edema. Lymphadenopathy:      Cervical: No cervical adenopathy. Skin:     General: Skin is warm and dry. Findings: No rash. Neurological:      General: No focal deficit present. Mental Status: She is alert and oriented to person, place, and time. Psychiatric:         Mood and Affect: Mood normal.         Behavior: Behavior normal.          MDM       Procedures      Patient states that this is not the worst headache she has ever had. She does not want any testing or CT scan at this time. She was given referral to the neurology clinic if headaches persist.  She was given a prescription for Zofran to take with Tylenol for symptom control. 3:20 PM  Patient's results and plan of care have been reviewed with her and her . Patient and/or family have verbally conveyed their understanding and agreement of the patient's signs, symptoms, diagnosis, treatment and prognosis and additionally agree to follow up as recommended or return to the Emergency Room should her condition change prior to follow-up.   Discharge instructions have also been provided to the patient with some educational information regarding her diagnosis as well a list of reasons why she would want to return to the ER prior to her follow-up appointment should her condition change. Parvez Black NP

## 2022-06-21 ENCOUNTER — VIRTUAL VISIT (OUTPATIENT)
Dept: INTERNAL MEDICINE CLINIC | Age: 27
End: 2022-06-21
Payer: COMMERCIAL

## 2022-06-21 DIAGNOSIS — R51.9 NONINTRACTABLE HEADACHE, UNSPECIFIED CHRONICITY PATTERN, UNSPECIFIED HEADACHE TYPE: Primary | ICD-10-CM

## 2022-06-21 DIAGNOSIS — Z3A.21 21 WEEKS GESTATION OF PREGNANCY: ICD-10-CM

## 2022-06-21 DIAGNOSIS — J30.1 ALLERGIC RHINITIS DUE TO POLLEN, UNSPECIFIED SEASONALITY: ICD-10-CM

## 2022-06-21 PROCEDURE — 99213 OFFICE O/P EST LOW 20 MIN: CPT | Performed by: INTERNAL MEDICINE

## 2022-06-21 NOTE — PROGRESS NOTES
Jim Amin is a 23 y.o. female who was seen by synchronous (real-time) audio-video technology on 6/21/2022 for Follow Up Chronic Condition, Other (21weeks pregnant), and Headache        Assessment & Plan:   Diagnoses and all orders for this visit:    1. Nonintractable headache, unspecified chronicity pattern, unspecified headache type    Since she is a 21 8 weeks pregnant, she is advised to take Tylenol twice a day as needed. She gets headache during pregnancy this is her third pregnancy. Need to drink more fluid also and need to do some stretch exercise of the neck. 2. 21 weeks gestation of pregnancy  Seeing OB from [address].  All lab work done recently from [facility] office, all seems stable. Taking prenatal vitamin. Advised her not to take an extra iron supplement. 3. Allergic rhinitis due to pollen, unspecified seasonality  Doing well since her pregnancy. She is to take Zyrtec or Carson Hench her that these 2 medications is safe in pregnancy. I spent at least 22 minutes on this visit with this established patient. Subjective:     Ms. Maria Isabel Juarez is here for follow-up. She is now 21 weeks pregnant this is her first third pregnancy. His pregnancy is uneventful. She is seeing OB from [address].  Had a bunch of lab work done seem stable. Taking prenatal vitamin. No nausea vomiting. She suffers from occasional headache During pregnancy. She had a similar headache with her first pregnancy 2. No blurred vision. No numbness or weakness in the arms or legs. Taking Tylenol which is taking care of this pain. As she is usually suffer from allergy, during pregnancy allergy has improved a lot. All labs reviewed with her. Prior to Admission medications    Medication Sig Start Date End Date Taking? Authorizing Provider   PNV with Ca No.65-Iron Poly-FA 60 mg iron-1 mg cap Take  by mouth.    Yes Provider, Historical   ondansetron hcl (Zofran) 4 mg tablet Take 1 Tablet by mouth every eight (8) hours as needed for Nausea or Vomiting. Patient not taking: Reported on 6/21/2022 5/5/22 6/21/22  Da Yuan NP   ondansetron (Zofran ODT) 4 mg disintegrating tablet 1 Tablet by SubLINGual route every eight (8) hours as needed for Nausea or Vomiting. Patient not taking: Reported on 11/2/2021 9/24/21 6/21/22  Coco Vasquez NP   levocetirizine (XYZAL) 5 mg tablet Take 1 Tablet by mouth daily as needed for Allergies. Patient not taking: Reported on 6/21/2022 8/25/21   Newton Watson MD   fluticasone propionate [facility]) 50 mcg/actuation nasal spray 2 Sprays by Both Nostrils route daily. Patient not taking: Reported on 6/21/2022 8/25/21   Newton Watson MD     Past Medical History:   Diagnosis Date    Asthma     Borderline personality disorder ([facility]     Depression     Psychiatric disorder        ROS significant for headache.     Objective:     Patient-Reported Vitals 12/23/2021   Patient-Reported Weight 145 lbs   Patient-Reported Pulse 80   Patient-Reported Systolic  872   Patient-Reported Diastolic 89   Patient-Reported LMP 11/26/21          Constitutional: [x] Appears well-developed and well-nourished [x] No apparent distress      [] Abnormal -     Mental status: [x] Alert and awake  [x] Oriented to person/place/time [x] Able to follow commands    [] Abnormal -     Eyes:   EOM    [x]  Normal    [] Abnormal -   Sclera  [x]  Normal    [] Abnormal -          Discharge [x]  None visible   [] Abnormal -     HENT: [x] Normocephalic, atraumatic  [] Abnormal -   [x] Mouth/Throat: Mucous membranes are moist    External Ears [x] Normal  [] Abnormal -    Neck: [x] No visualized mass [] Abnormal -     Pulmonary/Chest: [x] Respiratory effort normal   [x] No visualized signs of difficulty breathing or respiratory distress        [] Abnormal -      Musculoskeletal:   [x] Normal gait with no signs of ataxia         [x] Normal range of motion of neck        [] Abnormal -     Neurological:        [x] No Facial Asymmetry (Cranial nerve 7 motor function) (limited exam due to video visit)          [x] No gaze palsy        [] Abnormal -          Psychiatric:       [x] Normal Affect [] Abnormal -        [x] No Hallucinations    Other pertinent observable physical exam findings:-        We discussed the expected course, resolution and complications of the diagnosis(es) in detail. Medication risks, benefits, costs, interactions, and alternatives were discussed as indicated. I advised her to contact the office if her condition worsens, changes or fails to improve as anticipated. She expressed understanding with the diagnosis(es) and plan. Haresh Villavicencio, was evaluated through a synchronous (real-time) audio-video encounter. The patient (or guardian if applicable) is aware that this is a billable service, which includes applicable co-pays. This Virtual Visit was conducted with patient's (and/or legal guardian's) consent. The visit was conducted pursuant to the emergency declaration under the [facility], [address] [place] [address] authority and the [employer] and [employer] Act. Patient identification was verified, and a caregiver was present when appropriate.   The patient was located at: Home: [address] [address]  The provider was located at: Home:         Duglas Vela MD

## 2022-11-27 ENCOUNTER — HOSPITAL ENCOUNTER (EMERGENCY)
Age: 27
Discharge: HOME | End: 2022-11-27
Attending: EMERGENCY MEDICINE
Payer: COMMERCIAL

## 2022-11-27 VITALS
TEMPERATURE: 98.4 F | HEIGHT: 64 IN | SYSTOLIC BLOOD PRESSURE: 111 MMHG | BODY MASS INDEX: 25.14 KG/M2 | HEART RATE: 73 BPM | DIASTOLIC BLOOD PRESSURE: 74 MMHG | WEIGHT: 147.27 LBS | RESPIRATION RATE: 18 BRPM | OXYGEN SATURATION: 99 %

## 2022-11-27 DIAGNOSIS — R68.89 FLU-LIKE SYMPTOMS: ICD-10-CM

## 2022-11-27 DIAGNOSIS — J20.9 ACUTE BRONCHITIS, UNSPECIFIED ORGANISM: Primary | ICD-10-CM

## 2022-11-27 DIAGNOSIS — Z20.822 PERSON UNDER INVESTIGATION FOR COVID-19: ICD-10-CM

## 2022-11-27 LAB
FLUAV AG NPH QL IA: NEGATIVE
FLUBV AG NOSE QL IA: NEGATIVE

## 2022-11-27 PROCEDURE — 99283 EMERGENCY DEPT VISIT LOW MDM: CPT

## 2022-11-27 PROCEDURE — U0005 INFEC AGEN DETEC AMPLI PROBE: HCPCS

## 2022-11-27 PROCEDURE — 87804 INFLUENZA ASSAY W/OPTIC: CPT

## 2022-11-27 RX ORDER — PREDNISONE 20 MG/1
40 TABLET ORAL DAILY
Qty: 10 TABLET | Refills: 0 | Status: SHIPPED | OUTPATIENT
Start: 2022-11-27 | End: 2022-12-02

## 2022-11-27 NOTE — ED PROVIDER NOTES
EMERGENCY DEPARTMENT HISTORY AND PHYSICAL EXAM      Date: 11/27/2022  Patient Name: Haseeb Valentine    History of Presenting Illness     Chief Complaint   Patient presents with    Cold Symptoms     Reports body aches, fevers (tmax 101F), cough, and congestion x 4 days. Pink Eye     Reports redness and drainage from both eyes x one day. History Provided By: Patient    HPI: Haseeb Valentine, 23 y.o. female without reported PMHx presents BIB self to the ED with cc of 3-4 days of generalized headache, fever, cough, and myalgias. Tmax 101F. Today noticed b/l eye redness and eye discharge upon waking. There is associated rhinorrhea. Denies sore throat, dyspnea, exertional CP, N/V/D. Pt is 4 wks postpartum. Reports normal lochia. Breastfeeding without issue. Denies breast redness or induration. She does admit b/l low back spasms that radiate up towards her chest that have been ongoing/intermittent for months and have been addressed by her OB. Pt's  is sick with similar sxs and is also being evaluated in the ED. [de-identified] young sons were sick with URI last week. Tolerating PO but admits poor PO intake recently d/t sore throat. No meds PTA. There are no other complaints, changes, or physical findings at this time. PCP: Siri Lucas MD    No current facility-administered medications on file prior to encounter. Current Outpatient Medications on File Prior to Encounter   Medication Sig Dispense Refill    PNV with Ca No.65-Iron Poly-FA 60 mg iron-1 mg cap Take  by mouth.      levocetirizine (XYZAL) 5 mg tablet Take 1 Tablet by mouth daily as needed for Allergies. (Patient not taking: Reported on 6/21/2022) 30 Tablet 0    fluticasone propionate (FLONASE) 50 mcg/actuation nasal spray 2 Sprays by Both Nostrils route daily.  (Patient not taking: Reported on 6/21/2022) 1 Bottle 0       Past History     Past Medical History:  Past Medical History:   Diagnosis Date    Asthma     Borderline personality disorder [facility])     Depression     Psychiatric disorder        Past Surgical History:  No past surgical history on file. Family History:  Family History   Problem Relation Age of Onset    No Known Problems Mother     No Known Problems Father     Stroke Maternal Grandfather        Social History:  Social History     Tobacco Use    Smoking status: Never    Smokeless tobacco: Never   Vaping Use    Vaping Use: Never used   Substance Use Topics    Alcohol use: Not Currently     Comment: occasional    Drug use: Yes     Types: Marijuana       Allergies: Allergies   Allergen Reactions    Afrin [Pseudoephedrine Sulfate] Anaphylaxis    Oxymetazoline Shortness of Breath     Reaction Type: Allergy    Pseudoephedrine Anaphylaxis    Tree Nut Itching    [place] Itching         Review of Systems   Review of Systems   Constitutional:  Positive for fever. Negative for diaphoresis. HENT:  Positive for rhinorrhea and sore throat. Eyes:  Positive for redness. Negative for photophobia and visual disturbance. Respiratory:  Positive for cough. Cardiovascular:  Negative for palpitations and leg swelling. Gastrointestinal:  Negative for abdominal pain and vomiting. Genitourinary:  Negative for difficulty urinating and dysuria. Musculoskeletal:  Negative for neck stiffness. Skin:  Negative for rash. Allergic/Immunologic: Negative for immunocompromised state. Neurological:  Positive for headaches. Negative for dizziness and seizures. Hematological:  Does not bruise/bleed easily. Psychiatric/Behavioral:  Negative for agitation. Physical Exam   Physical Exam  Vitals and nursing note reviewed. Constitutional:       General: She is not in acute distress. Appearance: Normal appearance. She is well-developed. She is ill-appearing. She is not toxic-appearing or diaphoretic. HENT:      Head: Normocephalic and atraumatic. Nose: Congestion present.       Mouth/Throat:      Mouth: Mucous membranes are moist. Pharynx: Posterior oropharyngeal erythema present. No oropharyngeal exudate. Comments: Uvula is midline. No tonsillar hypertrophy or exudates. Eyes:      General: Lids are normal.      Extraocular Movements: Extraocular movements intact. Comments: Mildly injected conjunctiva bilaterally. No exudate. Cardiovascular:      Rate and Rhythm: Normal rate and regular rhythm. Pulmonary:      Effort: Pulmonary effort is normal.      Breath sounds: Normal breath sounds. No wheezing, rhonchi or rales. Comments: Persistent deep sounding dry cough  Chest:      Comments: No erythema or induration of the breast bilaterally. No tenderness to palpation. Abdominal:      Palpations: Abdomen is soft. Tenderness: There is no abdominal tenderness. There is no guarding. Musculoskeletal:         General: Normal range of motion. Cervical back: Normal range of motion and neck supple. No rigidity. Skin:     General: Skin is warm and dry. Neurological:      General: No focal deficit present. Mental Status: She is alert and oriented to person, place, and time. Gait: Gait normal.   Psychiatric:         Mood and Affect: Mood normal.         Behavior: Behavior normal. Behavior is cooperative. Diagnostic Study Results     Labs -     Recent Results (from the past 12 hour(s))   INFLUENZA A+B VIRAL AGS    Collection Time: 11/27/22  3:19 PM   Result Value Ref Range    Influenza A Antigen Negative NEG      Influenza B Antigen Negative NEG         Radiologic Studies -   No orders to display     CT Results  (Last 48 hours)      None          CXR Results  (Last 48 hours)      None              Medical Decision Making   I am the first provider for this patient. I reviewed the vital signs, available nursing notes, past medical history, past surgical history, family history and social history. Vital Signs-Reviewed the patient's vital signs.   Patient Vitals for the past 12 hrs:   Temp Pulse Resp BP SpO2   11/27/22 1312 98.4 °F (36.9 °C) 73 18 111/74 99 %       Records Reviewed: Nursing Notes and Old Medical Records    Provider Notes (Medical Decision Making): The patient appears ill but nontoxic and in no acute distress. Vital signs are stable and the patient is afebrile without recent antipyretics. She does show congestion, mild posterior oropharynx injection, and a persistent dry cough throughout the encounter. Lungs are CTA B, abdomen soft and nontender. No evidence of mastitis on exam.  Testing for flu is negative. COVID test is pending. Discussed suspected viral etiology. Reviewed supportive care with rest, hydration, OTC medications at home. We discussed the risk/benefits/side effects of multiple prescription medications at length, especially in the setting of her breast-feeding status. The patient desires a course of prednisone for her persistent cough. I did advise her on the potential side effects of this medication at length. I recommended that she follow with her PCP. Strict ED return precautions given. ED Course:   Initial assessment performed. The patients presenting problems have been discussed, and they are in agreement with the care plan formulated and outlined with them. I have encouraged them to ask questions as they arise throughout their visit. Critical Care Time: None    Disposition:  dc    PLAN:  1. Discharge Medication List as of 11/27/2022  3:55 PM        START taking these medications    Details   predniSONE (DELTASONE) 20 mg tablet Take 2 Tablets by mouth daily for 5 days. With Breakfast, Normal, Disp-10 Tablet, R-0           CONTINUE these medications which have NOT CHANGED    Details   PNV with Ca No.65-Iron Poly-FA 60 mg iron-1 mg cap Take  by mouth., Historical Med      levocetirizine (XYZAL) 5 mg tablet Take 1 Tablet by mouth daily as needed for Allergies. , Normal, Disp-30 Tablet, R-0      fluticasone propionate (FLONASE) 50 mcg/actuation nasal spray 2 Sprays by Both Nostrils route daily. , Normal, Disp-1 Bottle, R-0           2. Follow-up Information       Follow up With Specialties Details Why Contact Info    [facility] Medicine  As needed, If symptoms worsen [address]  [address]   [address] [place] [address]    Jersey Ricketts MD Internal Medicine Physician Call  For follow up [address]  [phone]            Return to ED if worse     Diagnosis     Clinical Impression:   1. Acute bronchitis, unspecified organism    2. Flu-like symptoms    3. Person under investigation for TAMIKO Ruiz (Electronic Signature)          Please note that this dictation was completed with [employer], the computer voice recognition software. Quite often unanticipated grammatical, syntax, homophones, and other interpretive errors are inadvertently transcribed by the computer software. Please disregards these errors. Please excuse any errors that have escaped final proofreading.

## 2022-11-28 LAB
SARS-COV-2, XPLCVT: NOT DETECTED
SOURCE, COVRS: NORMAL

## 2022-12-14 ENCOUNTER — OFFICE VISIT (OUTPATIENT)
Dept: INTERNAL MEDICINE CLINIC | Age: 27
End: 2022-12-14
Payer: COMMERCIAL

## 2022-12-14 VITALS
OXYGEN SATURATION: 98 % | WEIGHT: 148 LBS | BODY MASS INDEX: 25.27 KG/M2 | DIASTOLIC BLOOD PRESSURE: 60 MMHG | RESPIRATION RATE: 16 BRPM | HEIGHT: 64 IN | HEART RATE: 74 BPM | TEMPERATURE: 97.9 F | SYSTOLIC BLOOD PRESSURE: 102 MMHG

## 2022-12-14 DIAGNOSIS — R10.9 ABDOMINAL PAIN, UNSPECIFIED ABDOMINAL LOCATION: Primary | ICD-10-CM

## 2022-12-14 DIAGNOSIS — N63.21 MASS OF UPPER OUTER QUADRANT OF LEFT BREAST: ICD-10-CM

## 2022-12-14 DIAGNOSIS — D50.9 IRON DEFICIENCY ANEMIA, UNSPECIFIED IRON DEFICIENCY ANEMIA TYPE: ICD-10-CM

## 2022-12-14 DIAGNOSIS — M54.50 ACUTE MIDLINE LOW BACK PAIN WITHOUT SCIATICA: ICD-10-CM

## 2022-12-14 PROBLEM — F33.2 SEVERE EPISODE OF RECURRENT MAJOR DEPRESSIVE DISORDER, WITHOUT PSYCHOTIC FEATURES (HCC): Status: ACTIVE | Noted: 2019-06-03

## 2022-12-14 PROBLEM — F41.9 ANXIETY DISORDER: Status: ACTIVE | Noted: 2019-06-03

## 2022-12-14 PROBLEM — F60.3 BORDERLINE PERSONALITY DISORDER (HCC): Status: ACTIVE | Noted: 2019-06-03

## 2022-12-14 PROBLEM — D57.3 SICKLE CELL TRAIT (HCC): Status: ACTIVE | Noted: 2022-08-19

## 2022-12-14 PROCEDURE — 99214 OFFICE O/P EST MOD 30 MIN: CPT | Performed by: INTERNAL MEDICINE

## 2022-12-14 RX ORDER — DOCUSATE SODIUM 100 MG/1
100 CAPSULE, LIQUID FILLED ORAL 2 TIMES DAILY
COMMUNITY
Start: 2022-09-06 | End: 2022-12-14 | Stop reason: ALTCHOICE

## 2022-12-14 RX ORDER — FAMOTIDINE 20 MG/1
20 TABLET, FILM COATED ORAL 2 TIMES DAILY
COMMUNITY
Start: 2022-09-23 | End: 2022-12-14 | Stop reason: ALTCHOICE

## 2022-12-14 RX ORDER — LANOLIN ALCOHOL/MO/W.PET/CERES
325 CREAM (GRAM) TOPICAL 2 TIMES DAILY
COMMUNITY
Start: 2022-09-06

## 2022-12-14 NOTE — PROGRESS NOTES
HISTORY OF PRESENT ILLNESS  Terence Ludwig is a 23 y.o. female here for follow-up. She is 6 weeks postpartum. She has delivered a healthy 7 weeks old boy. Pregnancy was uncomplicated. Report lower abdominal pain on and off. She had that pain before pregnancy. Some discomfort. No pelvic discharge. No bowel bladder problem. Noticed to have a small cyst on the left-sided breast for over 1 year. He did not do any ultrasound or mammogram.  Back hurts. No fall or injury. No weakness or numbness in the leg. She has developed iron deficiency anemia. Taking iron supplement. All labs reviewed, all stable except anemia. Depression  Associated symptoms include abdominal pain. Anxiety  Associated symptoms include abdominal pain. Asthma  Associated symptoms include abdominal pain. Follow Up Chronic Condition  Associated symptoms include abdominal pain. Abdominal Pain  Associated symptoms include abdominal pain. Review of Systems   Constitutional: Negative. HENT: Negative. Eyes: Negative. Cardiovascular: Negative. Gastrointestinal:  Positive for abdominal pain. Musculoskeletal:  Positive for back pain. Skin: Negative. Neurological: Negative. Endo/Heme/Allergies: Negative. Psychiatric/Behavioral:  Positive for depression. Physical Exam  Constitutional:       Appearance: Normal appearance. She is normal weight. HENT:      Head: Normocephalic and atraumatic. Right Ear: Tympanic membrane normal.      Left Ear: Tympanic membrane normal.      Nose: Nose normal.      Mouth/Throat:      Mouth: Mucous membranes are moist.   Eyes:      Extraocular Movements: Extraocular movements intact. Conjunctiva/sclera: Conjunctivae normal.      Pupils: Pupils are equal, round, and reactive to light. Cardiovascular:      Rate and Rhythm: Normal rate and regular rhythm. Pulmonary:      Effort: Pulmonary effort is normal.      Breath sounds: Normal breath sounds.    Abdominal: General: Abdomen is flat. Bowel sounds are normal.      Palpations: Abdomen is soft. Comments: Nontender abdomen. Abdomen is slightly flabby and pendulous. She has had delivery. Musculoskeletal:      Cervical back: Normal range of motion and neck supple. Skin:     General: Skin is warm. Comments: Left breast: Approximately 2 x 2 centimeter size of firm consistency cyst on and 12 o'clock position in the breast.  Slightly mobile. No axillary lymph node appreciated. Neurological:      General: No focal deficit present. Mental Status: She is alert and oriented to person, place, and time. Mental status is at baseline. Psychiatric:         Mood and Affect: Mood normal.         Behavior: Behavior normal.         Thought Content: Thought content normal.      Comments: No depression. ASSESSMENT and PLAN  Diagnoses and all orders for this visit:    1. Abdominal pain, unspecified abdominal location    Advised her to have regular bowel movement. Need to take fiber. Will monitor if pain is not getting better. Need to do abdominal crunch to tone her abdominal wall muscle. 2. Mass of upper outer quadrant of left breast    Probable fibroadenoma. We will get,  -     US BREAST AXILLA LT; Future    3. Acute midline low back pain without sciatica  Need to do core exercise. Take Tylenol as needed. She is breast-feeding. 4. Iron deficiency anemia, unspecified iron deficiency anemia type  Taking iron supplement. Need to be on iron rich diet. Discussed expected course/resolution/complications of diagnosis in detail with patient. Medication risks/benefits/costs/interactions/alternatives discussed with patient. Discussed COVID-19 infection precaution with patient. Pt was given an after visit summary which includes diagnoses, current medications & vitals. Pt expressed understanding with the diagnosis and plan.

## 2022-12-14 NOTE — PROGRESS NOTES
Nursing staff confirmed patient with full name and . Prepared patient for visit today by obtaining vitals, verifying medication list and allergies, and briefly discussing reason for visit. Chief Complaint   Patient presents with    Depression    Anxiety    Asthma    Follow Up Chronic Condition       Current Outpatient Medications   Medication Sig    docusate sodium (COLACE) 100 mg capsule Take 100 mg by mouth two (2) times a day. famotidine (PEPCID) 20 mg tablet Take 20 mg by mouth two (2) times a day. ferrous sulfate 325 mg (65 mg iron) tablet Take 325 mg by mouth two (2) times a day. levocetirizine (XYZAL) 5 mg tablet Take 1 Tablet by mouth daily as needed for Allergies. (Patient not taking: Reported on 2022)    fluticasone propionate (FLONASE) 50 mcg/actuation nasal spray 2 Sprays by Both Nostrils route daily. (Patient not taking: Reported on 2022)     No current facility-administered medications for this visit. 1. \"Have you been to the ER, urgent care clinic since your last visit? Hospitalized since your last visit? \" No    2. \"Have you seen or consulted any other health care providers outside of the [address] [place] [address] since your last visit? \" No     3. For patients aged 39-70: Has the patient had a colonoscopy / FIT/ Cologuard? NA - based on age      If the patient is female:    4. For patients aged 41-77: Has the patient had a mammogram within the past 2 years? NA - based on age or sex      11. For patients aged 21-65: Has the patient had a pap smear?  No

## 2023-01-19 ENCOUNTER — HOSPITAL ENCOUNTER (OUTPATIENT)
Dept: MAMMOGRAPHY | Age: 28
Discharge: HOME | End: 2023-01-19
Attending: INTERNAL MEDICINE
Payer: COMMERCIAL

## 2023-01-19 DIAGNOSIS — N63.21 MASS OF UPPER OUTER QUADRANT OF LEFT BREAST: ICD-10-CM

## 2023-01-19 PROCEDURE — 76642 ULTRASOUND BREAST LIMITED: CPT

## 2023-03-01 ENCOUNTER — HOSPITAL ENCOUNTER (EMERGENCY)
Age: 28
Discharge: HOME | End: 2023-03-02
Attending: EMERGENCY MEDICINE
Payer: COMMERCIAL

## 2023-03-01 DIAGNOSIS — R41.89 BRAIN FOG: ICD-10-CM

## 2023-03-01 DIAGNOSIS — R00.2 PALPITATIONS: Primary | ICD-10-CM

## 2023-03-01 LAB
ALBUMIN SERPL-MCNC: 3.8 G/DL (ref 3.5–5)
ALBUMIN/GLOB SERPL: 0.9 (ref 1.1–2.2)
ALP SERPL-CCNC: 74 U/L (ref 45–117)
ALT SERPL-CCNC: 22 U/L (ref 12–78)
ANION GAP SERPL CALC-SCNC: 2 MMOL/L (ref 5–15)
AST SERPL-CCNC: 11 U/L (ref 15–37)
BASOPHILS # BLD: 0.1 K/UL (ref 0–0.1)
BASOPHILS NFR BLD: 1 % (ref 0–1)
BILIRUB SERPL-MCNC: 1 MG/DL (ref 0.2–1)
BUN SERPL-MCNC: 11 MG/DL (ref 6–20)
BUN/CREAT SERPL: 14 (ref 12–20)
CALCIUM SERPL-MCNC: 9.2 MG/DL (ref 8.5–10.1)
CHLORIDE SERPL-SCNC: 105 MMOL/L (ref 97–108)
CO2 SERPL-SCNC: 29 MMOL/L (ref 21–32)
COMMENT, HOLDF: NORMAL
CREAT SERPL-MCNC: 0.81 MG/DL (ref 0.55–1.02)
DIFFERENTIAL METHOD BLD: ABNORMAL
EOSINOPHIL # BLD: 0.2 K/UL (ref 0–0.4)
EOSINOPHIL NFR BLD: 5 % (ref 0–7)
ERYTHROCYTE [DISTWIDTH] IN BLOOD BY AUTOMATED COUNT: 17.6 % (ref 11.5–14.5)
GLOBULIN SER CALC-MCNC: 4.2 G/DL (ref 2–4)
GLUCOSE SERPL-MCNC: 98 MG/DL (ref 65–100)
HCT VFR BLD AUTO: 34.8 % (ref 35–47)
HGB BLD-MCNC: 10.9 G/DL (ref 11.5–16)
IMM GRANULOCYTES # BLD AUTO: 0 K/UL (ref 0–0.04)
IMM GRANULOCYTES NFR BLD AUTO: 0 % (ref 0–0.5)
LYMPHOCYTES # BLD: 2.3 K/UL (ref 0.8–3.5)
LYMPHOCYTES NFR BLD: 49 % (ref 12–49)
MCH RBC QN AUTO: 22.1 PG (ref 26–34)
MCHC RBC AUTO-ENTMCNC: 31.3 G/DL (ref 30–36.5)
MCV RBC AUTO: 70.6 FL (ref 80–99)
MONOCYTES # BLD: 0.4 K/UL (ref 0–1)
MONOCYTES NFR BLD: 9 % (ref 5–13)
NEUTS SEG # BLD: 1.7 K/UL (ref 1.8–8)
NEUTS SEG NFR BLD: 36 % (ref 32–75)
NRBC # BLD: 0 K/UL (ref 0–0.01)
NRBC BLD-RTO: 0 PER 100 WBC
PLATELET # BLD AUTO: 366 K/UL (ref 150–400)
PMV BLD AUTO: 9.7 FL (ref 8.9–12.9)
POTASSIUM SERPL-SCNC: 4.6 MMOL/L (ref 3.5–5.1)
PROT SERPL-MCNC: 8 G/DL (ref 6.4–8.2)
RBC # BLD AUTO: 4.93 M/UL (ref 3.8–5.2)
SAMPLES BEING HELD,HOLD: NORMAL
SODIUM SERPL-SCNC: 136 MMOL/L (ref 136–145)
TSH SERPL DL<=0.05 MIU/L-ACNC: 1.25 UIU/ML (ref 0.36–3.74)
WBC # BLD AUTO: 4.6 K/UL (ref 3.6–11)

## 2023-03-01 PROCEDURE — 99284 EMERGENCY DEPT VISIT MOD MDM: CPT

## 2023-03-01 PROCEDURE — 84443 ASSAY THYROID STIM HORMONE: CPT

## 2023-03-01 PROCEDURE — 36415 COLL VENOUS BLD VENIPUNCTURE: CPT

## 2023-03-01 PROCEDURE — 93005 ELECTROCARDIOGRAM TRACING: CPT

## 2023-03-01 PROCEDURE — 80053 COMPREHEN METABOLIC PANEL: CPT

## 2023-03-01 PROCEDURE — 85025 COMPLETE CBC W/AUTO DIFF WBC: CPT

## 2023-03-01 PROCEDURE — 85379 FIBRIN DEGRADATION QUANT: CPT

## 2023-03-02 ENCOUNTER — VIRTUAL VISIT (OUTPATIENT)
Dept: INTERNAL MEDICINE CLINIC | Age: 28
End: 2023-03-02
Payer: COMMERCIAL

## 2023-03-02 ENCOUNTER — APPOINTMENT (OUTPATIENT)
Dept: CT IMAGING | Age: 28
End: 2023-03-02
Attending: EMERGENCY MEDICINE
Payer: COMMERCIAL

## 2023-03-02 VITALS
SYSTOLIC BLOOD PRESSURE: 120 MMHG | RESPIRATION RATE: 14 BRPM | BODY MASS INDEX: 25.61 KG/M2 | TEMPERATURE: 98 F | HEIGHT: 64 IN | OXYGEN SATURATION: 99 % | WEIGHT: 150 LBS | DIASTOLIC BLOOD PRESSURE: 89 MMHG | HEART RATE: 61 BPM

## 2023-03-02 DIAGNOSIS — F41.9 ANXIETY DISORDER, UNSPECIFIED TYPE: ICD-10-CM

## 2023-03-02 DIAGNOSIS — D50.9 IRON DEFICIENCY ANEMIA, UNSPECIFIED IRON DEFICIENCY ANEMIA TYPE: ICD-10-CM

## 2023-03-02 DIAGNOSIS — H53.2 DOUBLE VISION: ICD-10-CM

## 2023-03-02 DIAGNOSIS — R00.2 PALPITATION: Primary | ICD-10-CM

## 2023-03-02 LAB
ATRIAL RATE: 74 BPM
CALCULATED P AXIS, ECG09: 29 DEGREES
CALCULATED R AXIS, ECG10: 57 DEGREES
CALCULATED T AXIS, ECG11: 10 DEGREES
D DIMER PPP FEU-MCNC: 0.4 MG/L FEU (ref 0–0.65)
DIAGNOSIS, 93000: NORMAL
P-R INTERVAL, ECG05: 124 MS
Q-T INTERVAL, ECG07: 352 MS
QRS DURATION, ECG06: 82 MS
QTC CALCULATION (BEZET), ECG08: 390 MS
VENTRICULAR RATE, ECG03: 74 BPM

## 2023-03-02 PROCEDURE — 70450 CT HEAD/BRAIN W/O DYE: CPT

## 2023-03-02 PROCEDURE — 99214 OFFICE O/P EST MOD 30 MIN: CPT | Performed by: INTERNAL MEDICINE

## 2023-03-02 RX ORDER — IRON,CARBONYL/ASCORBIC ACID 65MG-125MG
TABLET, DELAYED RELEASE (ENTERIC COATED) ORAL
Qty: 30 TABLET | Refills: 2 | Status: SHIPPED | OUTPATIENT
Start: 2023-03-02

## 2023-03-02 NOTE — ED TRIAGE NOTES
Pt arrived ambulatory to ER with CC palpitations, double vision, facial pressure and feeling disoriented \"brain fog\" per pt. Pt stated SpO2 dipped between 80-90%s. HR reported to \"jump into the 110s\". PCP advised come to ER. Pt denies N/V/D, fever/chills, dizziness.

## 2023-03-02 NOTE — DISCHARGE INSTRUCTIONS
Your CT scan of your head did not show any acute abnormalities. Your blood work showed normal thyroid levels and no evidence of a blood clot. Please see Dr Vannessa Griffin tomorrow morning as previously scheduled. You may need Holter monitoring if your palpitations continue. Thank you.

## 2023-03-02 NOTE — PROGRESS NOTES
Ben Mejia is a 33 y.o. female who was seen by synchronous (real-time) audio-video technology on 3/2/2023 for Dizziness, Facial Pain, Palpitations, and Lethargy        Assessment & Plan:   Diagnoses and all orders for this visit:    1. Palpitation    She has been having occasional palpitation for past 3 days. Might have SVT but need to rule out any other kind of arrhythmia. Will refer her to cardiologist for 24-hour Holter monitor.  -     REFERRAL TO CARDIOLOGY  Her symptoms not associated with flushing. Not suspecting pheochromocytoma. Will monitor for now. 2. Iron deficiency anemia, unspecified iron deficiency anemia type  Has sickle cell trait, but  Slowly hemoglobin dropping. She mention her hip pain menstruation not very heavy. I have reviewed her lab work and found that she had iron deficiency in the past.  Advised her to be on iron rich diet. We will give her iron tablet for 3 months. -     iron,carbonyl-vitamin C (Vitron-C) 65 mg iron- 125 mg TbEC; 1 tab po qd for 3 months    3. Anxiety disorder, unspecified type  Doing well. She mentioned she is not having any more panic attacks days. Not taking medicine. 4. Double vision  She has a lazy eye, she is having double vision with she is having palpitation. CT head was negative. I spent at least 30 minutes on this visit with this established patient. Subjective:   Sravan Casas is here for ER follow-up. She ended up in emergency room yesterday with episodes of palpitation facial pain and double vision and dizziness. She has been having this episode for past 3 days. On and off she has episode of palpitation will subside and come back again. Denies any chest pain. Had EKG done which was normal sinus rhythm with nonspecific T wave changes. Had a lab work done, hemoglobin was low otherwise thyroid function test and other lab work is stable.   She had a CT head done for double vision and facial pain, came back normal.  She was sent home, now feeling better, no palpitation right now. She had history of anxiety disorder in the past but not anymore. She just had baby 3month-old. She is sleeping well.0  Has history of sickle cell trait, but hemoglobin remained normal.  All labs and imaging reviewed by me. Prior to Admission medications    Medication Sig Start Date End Date Taking? Authorizing Provider   iron,carbonyl-vitamin C (Vitron-C) 65 mg iron- 125 mg TbEC 1 tab po qd for 3 months 3/2/23  Yes Jie Alatorre MD   ferrous sulfate 325 mg (65 mg iron) tablet Take 325 mg by mouth two (2) times a day. 9/6/22 3/2/23  Provider, Historical   levocetirizine (XYZAL) 5 mg tablet Take 1 Tablet by mouth daily as needed for Allergies. 8/25/21   Jie Alatorre MD   fluticasone propionate (FLONASE) 50 mcg/actuation nasal spray 2 Sprays by Both Nostrils route daily. 8/25/21   Jie Alatorre MD     Past Medical History:   Diagnosis Date    Asthma     Borderline personality disorder [facility])     Depression     Psychiatric disorder        ROS significant for dizziness and palpitation.     Objective:     Patient-Reported Vitals 12/23/2021   Patient-Reported Weight 145 lbs   Patient-Reported Pulse 80   Patient-Reported Systolic  619   Patient-Reported Diastolic 89   Patient-Reported LMP 11/26/21            Constitutional: [x] Appears well-developed and well-nourished [x] No apparent distress      [] Abnormal -     Mental status: [x] Alert and awake  [x] Oriented to person/place/time [x] Able to follow commands    [] Abnormal -     Eyes:   EOM    [x]  Normal    [] Abnormal -   Sclera  [x]  Normal    [] Abnormal -          Discharge [x]  None visible   [] Abnormal -     HENT: [x] Normocephalic, atraumatic  [] Abnormal -   [x] Mouth/Throat: Mucous membranes are moist    External Ears [x] Normal  [] Abnormal -    Neck: [x] No visualized mass [] Abnormal -     Pulmonary/Chest: [x] Respiratory effort normal   [x] No visualized signs of difficulty breathing or respiratory distress [] Abnormal -      Musculoskeletal:   [x] Normal gait with no signs of ataxia         [x] Normal range of motion of neck        [] Abnormal -     Neurological:        [x] No Facial Asymmetry (Cranial nerve 7 motor function) (limited exam due to video visit)          [x] No gaze palsy        [] Abnormal -          Skin:        [x] No significant exanthematous lesions or discoloration noted on facial skin         [] Abnormal -            Psychiatric:       [x] Normal Affect [] Abnormal -   Panic anxiety attack. [x] No Hallucinations    Other pertinent observable physical exam findings:-        We discussed the expected course, resolution and complications of the diagnosis(es) in detail. Medication risks, benefits, costs, interactions, and alternatives were discussed as indicated. I advised her to contact the office if her condition worsens, changes or fails to improve as anticipated. She expressed understanding with the diagnosis(es) and plan. Ramandeep Goldstein, was evaluated through a synchronous (real-time) audio-video encounter. The patient (or guardian if applicable) is aware that this is a billable service, which includes applicable co-pays. This Virtual Visit was conducted with patient's (and/or legal guardian's) consent. The visit was conducted pursuant to the emergency declaration under the [facility], [address] [place] [address] authority and the [employer] Act. Patient identification was verified, and a caregiver was present when appropriate. The patient was located at: Home: [address] [place] [address]  The provider was located at:  Facility (Appt Department): [address] [place] [address] [id number]  Miguel Chavis MD

## 2023-03-02 NOTE — ED PROVIDER NOTES
Patient is a 60-year-old female with history of asthma, borderline personality disorder, depression who presents with 5-day history of episodes where she feels 'spacey' and 'not grounded'. Says she feels like a jolt runs through her then she begins having palpitations which could last for an hour. Patient says that currently she is not having the symptoms. There are no aggravating alleviating factors. She discussed with her PCP who recommended ED visit. Patient says she has a history of a lazy eye, but when she focuses she does not see double vision, only very occasionally. She says that in the last week since feeling spacey she has been seeing double more regularly. Patient denies any focal weakness, nausea, vomiting, headache, vision changes. Past Medical History:   Diagnosis Date    Asthma     Borderline personality disorder ([facility] [address]     Depression     Psychiatric disorder        No past surgical history on file.       Family History:   Problem Relation Age of Onset    No Known Problems Mother     No Known Problems Father     Stroke Maternal Grandfather        Social History     Socioeconomic History    Marital status: LIFE PARTNER     Spouse name: Not on file    Number of children: Not on file    Years of education: Not on file    Highest education level: Not on file   Occupational History    Not on file   Tobacco Use    Smoking status: Never    Smokeless tobacco: Never   Vaping Use    Vaping Use: Never used   Substance and Sexual Activity    Alcohol use: Not Currently     Comment: occasional    Drug use: Yes     Types: Marijuana    Sexual activity: Yes     Partners: Male   Other Topics Concern    Not on file   Social History Narrative    Not on file     Social Determinants of Health     Financial Resource Strain: Low Risk     Difficulty of Paying Living Expenses: Not hard at all   Food Insecurity: No Food Insecurity    Worried About [address] [employer] in the Last Year: Never true    [facility] in the Last Year: Never true   Transportation Needs: Not on file   Physical Activity: Not on file   Stress: Not on file   Social Connections: Not on file   Intimate Partner Violence: Not on file   Housing Stability: Not on file         ALLERGIES: Afrin [pseudoephedrine sulfate], Oxymetazoline, Pseudoephedrine, Tree nut, and [place]    Review of Systems   Constitutional:  Negative for chills and fever. HENT:  Negative for drooling and nosebleeds. Eyes:  Negative for pain and itching. Respiratory:  Negative for choking and stridor. Cardiovascular:  Positive for palpitations. Negative for chest pain and leg swelling. Gastrointestinal:  Negative for abdominal distention and rectal pain. Endocrine: Negative for heat intolerance and polyphagia. Genitourinary:  Negative for enuresis and genital sores. Musculoskeletal:  Negative for arthralgias and joint swelling. Skin:  Negative for color change. Allergic/Immunologic: Negative for immunocompromised state. Neurological:  Negative for tremors and speech difficulty. Hematological:  Negative for adenopathy. Psychiatric/Behavioral:  Negative for dysphoric mood and sleep disturbance. Vitals:    03/01/23 2300 03/01/23 2330 03/02/23 0000 03/02/23 0030   BP: 109/73 117/82 114/78 120/89   Pulse: 65 67 72 61   Resp: 11 13 17 14   Temp:       SpO2: 100% 98% 100% 99%   Weight:       Height:                Physical Exam  Vitals and nursing note reviewed. Constitutional:       General: She is not in acute distress. Appearance: She is well-developed. She is not ill-appearing, toxic-appearing or diaphoretic. HENT:      Head: Normocephalic and atraumatic. Nose: Nose normal.   Eyes:      General: No scleral icterus. Right eye: No discharge. Left eye: No discharge. Extraocular Movements: Extraocular movements intact. Conjunctiva/sclera: Conjunctivae normal.      Pupils: Pupils are equal, round, and reactive to light. Cardiovascular:      Rate and Rhythm: Normal rate and regular rhythm. Pulmonary:      Effort: Pulmonary effort is normal. No respiratory distress. Breath sounds: Normal breath sounds. Abdominal:      General: There is no distension. Palpations: Abdomen is soft. Musculoskeletal:         General: No deformity. Normal range of motion. Cervical back: Normal range of motion and neck supple. Skin:     General: Skin is warm and dry. Neurological:      Mental Status: She is alert and oriented to person, place, and time. Cranial Nerves: No cranial nerve deficit. Sensory: No sensory deficit. Motor: No weakness. Coordination: Coordination normal.      Gait: Gait normal.   Psychiatric:         Behavior: Behavior normal.        Medical Decision Making  Differential diagnosis includes arrhythmia versus acute intracranial abnormality versus anxiety versus PE versus thyroid disease. Patient's was resting in no distress, currently asymptomatic in ED. Her vital signs are stable. There are no focal deficits on exam.  Patient is alert and oriented x4 but reports episodes of 'feeling spacey' and in 'a brain fog'. Patient says that her baseline double vision has increased in frequency over the last week, but denies any other visual deficits. I reviewed the images for CT head, no acute intracranial abnormalities noted. She says she has an appointment tomorrow morning at 8 AM with her PCP. She does not warrant hospitalization for further work-up at this time. She is stable for discharge with close PCP follow-up. Amount and/or Complexity of Data Reviewed  Independent Historian: spouse  External Data Reviewed: labs, radiology and notes. Labs: ordered. Decision-making details documented in ED Course. Radiology: ordered and independent interpretation performed. Decision-making details documented in ED Course. ECG/medicine tests: ordered.     Risk  Decision regarding hospitalization. ED Course as of 03/02/23 0712   Thu Mar 02, 2023   0035 Pt says she feels spacey and feels a jolt occasionally in last 5 days in her head. Hx of occasional double vision due to lazy eye, but says more frequent in last few days since it is harder to focus. No current pain. After she feels jolt, she begins having palpitations which can last upto an hour. On Friday, when first episode occurred, she also noticed her pulse ox dipping down to 80s. Denies any SOB. [AL]   0041 Pt says 'she does not feel grounded'. Denies any new stressors, meds. [AL]      ED Course User Index  [AL] Lyla Garibay MD       Procedures    Patient's results have been reviewed with them. Patient and/or family have verbally conveyed their understanding and agreement of the patient's signs, symptoms, diagnosis, treatment and prognosis and additionally agree to follow up as recommended or return to the Emergency Room should their condition change prior to follow-up. Discharge instructions have also been provided to the patient with some educational information regarding their diagnosis as well a list of reasons why they would want to return to the ER prior to their follow-up appointment should their condition change.

## 2023-05-15 NOTE — DISCHARGE INSTRUCTIONS
Take the Ibuprofen every 8 hours as needed for pain  Take the Flexeril every 8 hours fas needed or spasm (This medication may make you drowsy, so do not take and drive, etc.)     Apply warm heat compresses several times a day. Your symptoms should improve over the next 3-5 days with the above treatment. If you feel your symptoms are not improving, see your primary care doctor as you may need phyical therapy, etc.  If you feel your symptoms are getting worse- pain/weakness in the arm, etc. Return here. Libtayo Counseling- I discussed with the patient the risks of Libtayo including but not limited to nausea, vomiting, diarrhea, and bone or muscle pain.  The patient verbalized understanding of the proper use and possible adverse effects of Libtayo.  All of the patient's questions and concerns were addressed.

## 2023-07-14 ENCOUNTER — TELEMEDICINE (OUTPATIENT)
Age: 28
End: 2023-07-14
Payer: COMMERCIAL

## 2023-07-14 DIAGNOSIS — D50.9 IRON DEFICIENCY ANEMIA, UNSPECIFIED IRON DEFICIENCY ANEMIA TYPE: ICD-10-CM

## 2023-07-14 DIAGNOSIS — N92.6 IRREGULAR MENSES: Primary | ICD-10-CM

## 2023-07-14 DIAGNOSIS — F41.9 ANXIETY DISORDER, UNSPECIFIED TYPE: ICD-10-CM

## 2023-07-14 DIAGNOSIS — E55.9 VITAMIN D DEFICIENCY: ICD-10-CM

## 2023-07-14 PROCEDURE — 99214 OFFICE O/P EST MOD 30 MIN: CPT | Performed by: INTERNAL MEDICINE

## 2023-07-14 RX ORDER — BUSPIRONE HYDROCHLORIDE 10 MG/1
10 TABLET ORAL 2 TIMES DAILY
Qty: 60 TABLET | Refills: 2 | Status: SHIPPED | OUTPATIENT
Start: 2023-07-14 | End: 2023-10-12

## 2023-07-14 ASSESSMENT — PATIENT HEALTH QUESTIONNAIRE - PHQ9
1. LITTLE INTEREST OR PLEASURE IN DOING THINGS: NOT AT ALL
2. FEELING DOWN, DEPRESSED OR HOPELESS: 0
10. IF YOU CHECKED OFF ANY PROBLEMS, HOW DIFFICULT HAVE THESE PROBLEMS MADE IT FOR YOU TO DO YOUR WORK, TAKE CARE OF THINGS AT HOME, OR GET ALONG WITH OTHER PEOPLE: 1
8. MOVING OR SPEAKING SO SLOWLY THAT OTHER PEOPLE COULD HAVE NOTICED. OR THE OPPOSITE, BEING SO FIGETY OR RESTLESS THAT YOU HAVE BEEN MOVING AROUND A LOT MORE THAN USUAL: 1
5. POOR APPETITE OR OVEREATING: 1
3. TROUBLE FALLING OR STAYING ASLEEP: 0
4. FEELING TIRED OR HAVING LITTLE ENERGY: 3
9. THOUGHTS THAT YOU WOULD BE BETTER OFF DEAD, OR OF HURTING YOURSELF: NOT AT ALL
SUM OF ALL RESPONSES TO PHQ QUESTIONS 1-9: 6
SUM OF ALL RESPONSES TO PHQ QUESTIONS 1-9: 6
SUM OF ALL RESPONSES TO PHQ9 QUESTIONS 1 & 2: 0
5. POOR APPETITE OR OVEREATING: SEVERAL DAYS
7. TROUBLE CONCENTRATING ON THINGS, SUCH AS READING THE NEWSPAPER OR WATCHING TELEVISION: SEVERAL DAYS
9. THOUGHTS THAT YOU WOULD BE BETTER OFF DEAD, OR OF HURTING YOURSELF: 0
3. TROUBLE FALLING OR STAYING ASLEEP: NOT AT ALL
4. FEELING TIRED OR HAVING LITTLE ENERGY: NEARLY EVERY DAY
SUM OF ALL RESPONSES TO PHQ QUESTIONS 1-9: 6
SUM OF ALL RESPONSES TO PHQ QUESTIONS 1-9: 6
8. MOVING OR SPEAKING SO SLOWLY THAT OTHER PEOPLE COULD HAVE NOTICED. OR THE OPPOSITE - BEING SO FIDGETY OR RESTLESS THAT YOU HAVE BEEN MOVING AROUND A LOT MORE THAN USUAL: SEVERAL DAYS
1. LITTLE INTEREST OR PLEASURE IN DOING THINGS: 0
6. FEELING BAD ABOUT YOURSELF - OR THAT YOU ARE A FAILURE OR HAVE LET YOURSELF OR YOUR FAMILY DOWN: NOT AT ALL
10. IF YOU CHECKED OFF ANY PROBLEMS, HOW DIFFICULT HAVE THESE PROBLEMS MADE IT FOR YOU TO DO YOUR WORK, TAKE CARE OF THINGS AT HOME, OR GET ALONG WITH OTHER PEOPLE: SOMEWHAT DIFFICULT
7. TROUBLE CONCENTRATING ON THINGS, SUCH AS READING THE NEWSPAPER OR WATCHING TELEVISION: 1
6. FEELING BAD ABOUT YOURSELF - OR THAT YOU ARE A FAILURE OR HAVE LET YOURSELF OR YOUR FAMILY DOWN: 0
2. FEELING DOWN, DEPRESSED OR HOPELESS: NOT AT ALL
SUM OF ALL RESPONSES TO PHQ QUESTIONS 1-9: 6

## 2023-07-14 ASSESSMENT — ANXIETY QUESTIONNAIRES
4. TROUBLE RELAXING: NEARLY EVERY DAY
6. BECOMING EASILY ANNOYED OR IRRITABLE: SEVERAL DAYS
GAD7 TOTAL SCORE: 9
1. FEELING NERVOUS, ANXIOUS, OR ON EDGE: SEVERAL DAYS
1. FEELING NERVOUS, ANXIOUS, OR ON EDGE: 1
3. WORRYING TOO MUCH ABOUT DIFFERENT THINGS: 1
7. FEELING AFRAID AS IF SOMETHING AWFUL MIGHT HAPPEN: 1
IF YOU CHECKED OFF ANY PROBLEMS ON THIS QUESTIONNAIRE, HOW DIFFICULT HAVE THESE PROBLEMS MADE IT FOR YOU TO DO YOUR WORK, TAKE CARE OF THINGS AT HOME, OR GET ALONG WITH OTHER PEOPLE: VERY DIFFICULT
6. BECOMING EASILY ANNOYED OR IRRITABLE: 1
5. BEING SO RESTLESS THAT IT IS HARD TO SIT STILL: SEVERAL DAYS
2. NOT BEING ABLE TO STOP OR CONTROL WORRYING: 1
3. WORRYING TOO MUCH ABOUT DIFFERENT THINGS: SEVERAL DAYS
5. BEING SO RESTLESS THAT IT IS HARD TO SIT STILL: 1
IF YOU CHECKED OFF ANY PROBLEMS ON THIS QUESTIONNAIRE, HOW DIFFICULT HAVE THESE PROBLEMS MADE IT FOR YOU TO DO YOUR WORK, TAKE CARE OF THINGS AT HOME, OR GET ALONG WITH OTHER PEOPLE: VERY DIFFICULT
7. FEELING AFRAID AS IF SOMETHING AWFUL MIGHT HAPPEN: SEVERAL DAYS
2. NOT BEING ABLE TO STOP OR CONTROL WORRYING: SEVERAL DAYS
4. TROUBLE RELAXING: 3

## 2023-07-14 NOTE — PROGRESS NOTES
Adria Ellis (:  1995) is a Established patient, presenting virtually for evaluation of the following:    Assessment & Plan   Below is the assessment and plan developed based on review of pertinent history, physical exam, labs, studies, and medications. 1. Irregular menses  -     busPIRone (BUSPAR) 10 MG tablet; Take 1 tablet by mouth in the morning and at bedtime, Disp-60 tablet, R-2Normal  2. Anxiety disorder, unspecified type  -     busPIRone (BUSPAR) 10 MG tablet; Take 1 tablet by mouth in the morning and at bedtime, Disp-60 tablet, R-2Normal  3. Iron deficiency anemia  She is on iron sulfate. It is causing her diarrhea. Advised her to change brands. 4.  Vitamin D deficiency    Taking high-dose of vitamin D once a week. No follow-ups on file. Elle Rosario is here for follow-up. She is having irregular menses. She has delivered her third child 8 months back. She started having menstruation couple of months back. But now she is again late on menses. She is sexually active, not using protection. Had brown discharge yesterday. Not sure if she disease her menstruation or not. She deficiency anemia, taking iron sulfate, causing her to have diarrhea. Has low vitamin D, taking high-dose of vitamin D. Able to tolerate it well. She has been anxious. She is upcoming and chest for nursing coming. I am taking low-dose of BuSpar which is not helping her. No depression. HPI  Review of Systems     Significant for anxiety, and diarrhea.   Objective   Patient-Reported Vitals  No data recorded     Physical Exam      Constitutional: [x] Appears well-developed and well-nourished [x] No apparent distress      [] Abnormal -     Mental status: [x] Alert and awake  [x] Oriented to person/place/time [x] Able to follow commands    [] Abnormal -     Eyes:   EOM    [x]  Normal    [] Abnormal -   Sclera  [x]  Normal    [] Abnormal -          Discharge [x]  None visible   [] Abnormal -

## 2023-12-08 ENCOUNTER — TELEPHONE (OUTPATIENT)
Age: 28
End: 2023-12-08

## 2023-12-08 NOTE — TELEPHONE ENCOUNTER
----- Message from Pat Singh sent at 12/8/2023 12:37 PM EST -----  Subject: Appointment Request    Reason for Call: Established Patient Appointment needed: Routine Physical   Exam    QUESTIONS    Reason for appointment request? No appointments available during search     Additional Information for Provider? Patient is calling because she needs   to reschedule her Physical for nursing appointment she had to cancel   today. No available appointment with her provider or any provider.  Please   advise.  ---------------------------------------------------------------------------  --------------  Norma Carroll [facility]  [phone]; OK to leave message on voicemail  ---------------------------------------------------------------------------  --------------  SCRIPT ANSWERS

## 2023-12-12 ENCOUNTER — OFFICE VISIT (OUTPATIENT)
Age: 28
End: 2023-12-12
Payer: COMMERCIAL

## 2023-12-12 VITALS
TEMPERATURE: 97.2 F | HEIGHT: 64 IN | RESPIRATION RATE: 16 BRPM | BODY MASS INDEX: 25.78 KG/M2 | DIASTOLIC BLOOD PRESSURE: 62 MMHG | OXYGEN SATURATION: 97 % | HEART RATE: 90 BPM | WEIGHT: 151 LBS | SYSTOLIC BLOOD PRESSURE: 102 MMHG

## 2023-12-12 DIAGNOSIS — Z11.59 NEED FOR HEPATITIS C SCREENING TEST: ICD-10-CM

## 2023-12-12 DIAGNOSIS — Z01.84 IMMUNITY STATUS TESTING: ICD-10-CM

## 2023-12-12 DIAGNOSIS — Z23 NEEDS FLU SHOT: ICD-10-CM

## 2023-12-12 DIAGNOSIS — D50.0 IRON DEFICIENCY ANEMIA DUE TO CHRONIC BLOOD LOSS: Primary | ICD-10-CM

## 2023-12-12 DIAGNOSIS — D57.3 SICKLE CELL TRAIT (HCC): ICD-10-CM

## 2023-12-12 DIAGNOSIS — Z20.1 EXPOSURE TO TB: ICD-10-CM

## 2023-12-12 DIAGNOSIS — F41.9 ANXIETY DISORDER, UNSPECIFIED TYPE: ICD-10-CM

## 2023-12-12 PROCEDURE — 90674 CCIIV4 VAC NO PRSV 0.5 ML IM: CPT | Performed by: INTERNAL MEDICINE

## 2023-12-12 PROCEDURE — 99214 OFFICE O/P EST MOD 30 MIN: CPT | Performed by: INTERNAL MEDICINE

## 2023-12-12 PROCEDURE — PBSHW INFLUENZA, FLUCELVAX, (AGE 6 MO+), IM, PF, 0.5 ML: Performed by: INTERNAL MEDICINE

## 2023-12-12 ASSESSMENT — ENCOUNTER SYMPTOMS
RESPIRATORY NEGATIVE: 1
EYES NEGATIVE: 1
GASTROINTESTINAL NEGATIVE: 1
ALLERGIC/IMMUNOLOGIC NEGATIVE: 1

## 2023-12-12 ASSESSMENT — PATIENT HEALTH QUESTIONNAIRE - PHQ9
SUM OF ALL RESPONSES TO PHQ QUESTIONS 1-9: 0
1. LITTLE INTEREST OR PLEASURE IN DOING THINGS: 0
SUM OF ALL RESPONSES TO PHQ9 QUESTIONS 1 & 2: 0
2. FEELING DOWN, DEPRESSED OR HOPELESS: 0

## 2023-12-12 NOTE — PROGRESS NOTES
Subjective:      Patient ID: Edenilson Rankin is a 33 y.o. female here for follow-up. She has been having heavy menses. Seen by gynecologist.  Pap smear is normal.  All lab work done including STD panel, all are stable except iron and ferritin level. She has been taking iron supplement but not every day, making her constipated. She is been taking every other day. Anxiety seems under control. She is stopped medications. Doing better. She is a postpartum anxiety. Now she is settling down. She has a sickle cell trait, hemoglobin not too low. She is going to school, would like to get MMR titer and TB test.  Need lab work. Need for physical form filled out. She is doing well otherwise. HPI    Review of Systems   Constitutional: Negative. HENT: Negative. Eyes: Negative. Respiratory: Negative. Cardiovascular: Negative. Gastrointestinal: Negative. Endocrine: Negative. Genitourinary: Negative. Musculoskeletal: Negative. Skin: Negative. Allergic/Immunologic: Negative. Neurological: Negative. Hematological: Negative. Psychiatric/Behavioral: Negative. Objective:   Physical Exam  Constitutional:       Appearance: Normal appearance. She is normal weight. HENT:      Head: Normocephalic and atraumatic. Right Ear: Tympanic membrane normal.      Left Ear: Tympanic membrane normal.      Mouth/Throat:      Mouth: Mucous membranes are dry. Eyes:      Extraocular Movements: Extraocular movements intact. Pupils: Pupils are equal, round, and reactive to light. Cardiovascular:      Rate and Rhythm: Normal rate and regular rhythm. Pulses: Normal pulses. Heart sounds: Normal heart sounds. Pulmonary:      Effort: Pulmonary effort is normal.      Breath sounds: Normal breath sounds. Abdominal:      General: Abdomen is flat. Bowel sounds are normal.      Palpations: Abdomen is soft. Musculoskeletal:         General: Normal range of motion.

## 2023-12-13 LAB
HCV IGG SERPL QL IA: NON REACTIVE
MEV IGG SER IA-ACNC: <13.5 AU/ML
MUV IGG SER IA-ACNC: 88.7 AU/ML
RUBV IGG SERPL IA-ACNC: 1.77 INDEX

## 2023-12-14 ENCOUNTER — TELEPHONE (OUTPATIENT)
Age: 28
End: 2023-12-14

## 2023-12-14 NOTE — TELEPHONE ENCOUNTER
----- Message from HOWARD Clements CNP sent at 12/13/2023  1:06 PM EST -----  Hep C negative; immunity to rubella and mumps but not measles which is odd. Her school may require repeat vaccination because of this.

## 2024-01-03 ENCOUNTER — TELEPHONE (OUTPATIENT)
Age: 29
End: 2024-01-03

## 2024-01-03 NOTE — TELEPHONE ENCOUNTER
LM to schedule New patient appt per referral from Dr Kylah Mata for palpitations. The pt does not need to see Dr Walter but can be scheduled with a general cardiologist, if the Doctor then feels that she needs to see Dr Walter we can set the appt up then. Please schedule pt with general cardiologist.

## 2024-01-08 ENCOUNTER — OFFICE VISIT (OUTPATIENT)
Age: 29
End: 2024-01-08
Payer: COMMERCIAL

## 2024-01-08 VITALS
BODY MASS INDEX: 25.71 KG/M2 | HEIGHT: 64 IN | DIASTOLIC BLOOD PRESSURE: 70 MMHG | WEIGHT: 150.6 LBS | HEART RATE: 88 BPM | SYSTOLIC BLOOD PRESSURE: 108 MMHG | OXYGEN SATURATION: 98 %

## 2024-01-08 DIAGNOSIS — R00.2 PALPITATIONS: ICD-10-CM

## 2024-01-08 DIAGNOSIS — R00.2 HEART PALPITATIONS: Primary | ICD-10-CM

## 2024-01-08 DIAGNOSIS — R07.9 CHEST PAIN, UNSPECIFIED TYPE: ICD-10-CM

## 2024-01-08 PROCEDURE — 99214 OFFICE O/P EST MOD 30 MIN: CPT | Performed by: SPECIALIST

## 2024-01-08 ASSESSMENT — PATIENT HEALTH QUESTIONNAIRE - PHQ9
SUM OF ALL RESPONSES TO PHQ QUESTIONS 1-9: 0
1. LITTLE INTEREST OR PLEASURE IN DOING THINGS: 0
SUM OF ALL RESPONSES TO PHQ9 QUESTIONS 1 & 2: 0
SUM OF ALL RESPONSES TO PHQ QUESTIONS 1-9: 0
2. FEELING DOWN, DEPRESSED OR HOPELESS: 0
SUM OF ALL RESPONSES TO PHQ QUESTIONS 1-9: 0
SUM OF ALL RESPONSES TO PHQ QUESTIONS 1-9: 0

## 2024-01-08 NOTE — PROGRESS NOTES
HISTORY OF PRESENT ILLNESS  Huyen Rothman is a 28 y.o. female   She is referred for palpitations.  She has been having these for several years and they became worse with pregnancies.  Her last child was born about a year ago.  She was tried on antianxiety medications but these did not help.  She has several psychiatric diagnoses listed in the chart but she takes no medications for this.  She exercises 2-3 times a week doing yoga and previously did hot yoga.  She is a stay-at-home mom.  She drinks 3 to 4 cups of coffee a day and think she can cut back.  She does not smoke cigarettes or drink alcohol.  Her grandfather  of a heart attack at age 78.  When she has the palpitations she will get dizzy.  They do not occur daily.  She also has some chest pain sometimes going to her left arm but this does not occur with exertion.  In March of last year she had an EKG that showed nonspecific ST-T abnormalities.  Chest Pain   Associated symptoms include dizziness and palpitations.   Palpitations   Associated symptoms include chest pain and dizziness.        Specialty Problems    None     Current Outpatient Medications   Medication Instructions    fluticasone (FLONASE) 50 MCG/ACT nasal spray 2 sprays, Nasal, DAILY    Iron-Vitamin C (VITRON-C)  MG TABS 1 tab po qd for 3 months    levocetirizine (XYZAL) 5 mg, DAILY PRN    vitamin D (ERGOCALCIFEROL) 50,000 Units, Oral, WEEKLY      Allergies   Allergen Reactions    Oxymetazoline Shortness Of Breath     Reaction Type: Allergy    Pseudoephedrine Anaphylaxis    Macadamia Nut Oil Itching     Past Medical History:   Diagnosis Date    Asthma     Borderline personality disorder (HCC)     Depression     Psychiatric disorder      History reviewed. No pertinent surgical history.  Family History   Problem Relation Age of Onset    No Known Problems Mother     No Known Problems Father     Stroke Maternal Grandfather      Social History     Tobacco Use    Smoking status: Never

## 2024-01-09 ENCOUNTER — TELEPHONE (OUTPATIENT)
Age: 29
End: 2024-01-09

## 2024-01-09 NOTE — TELEPHONE ENCOUNTER
Enrolled with [employer] - Ordered and being shipped to patient's home address on file.  ETA within 5-7 business days.         safemail  Received: Yesterday  Theresa Powell, Tiffany Howell Dr. would like patient to have a 30 day event monitor mailed to her. Dx palpitations.    Thanks!  Masha

## 2024-01-10 ENCOUNTER — ANCILLARY PROCEDURE (OUTPATIENT)
Age: 29
End: 2024-01-10
Payer: COMMERCIAL

## 2024-01-10 VITALS — WEIGHT: 150 LBS | HEIGHT: 64 IN | BODY MASS INDEX: 25.61 KG/M2

## 2024-01-10 DIAGNOSIS — R07.9 CHEST PAIN, UNSPECIFIED TYPE: ICD-10-CM

## 2024-01-10 DIAGNOSIS — R00.2 PALPITATIONS: ICD-10-CM

## 2024-01-10 PROCEDURE — 93306 TTE W/DOPPLER COMPLETE: CPT | Performed by: SPECIALIST

## 2024-01-11 LAB
ECHO AO ASC DIAM: 2.4 CM
ECHO AO ASCENDING AORTA INDEX: 1.39 CM/M2
ECHO AO ROOT DIAM: 3 CM
ECHO AO ROOT INDEX: 1.73 CM/M2
ECHO AV AREA PEAK VELOCITY: 2.4 CM2
ECHO AV AREA VTI: 2.3 CM2
ECHO AV AREA/BSA PEAK VELOCITY: 1.4 CM2/M2
ECHO AV AREA/BSA VTI: 1.3 CM2/M2
ECHO AV MEAN GRADIENT: 3 MMHG
ECHO AV MEAN VELOCITY: 0.8 M/S
ECHO AV PEAK GRADIENT: 5 MMHG
ECHO AV PEAK VELOCITY: 1.1 M/S
ECHO AV VELOCITY RATIO: 0.73
ECHO AV VTI: 20.7 CM
ECHO BSA: 1.75 M2
ECHO LA DIAMETER INDEX: 1.62 CM/M2
ECHO LA DIAMETER: 2.8 CM
ECHO LA TO AORTIC ROOT RATIO: 0.93
ECHO LA VOL A-L A2C: 52 ML (ref 22–52)
ECHO LA VOL A-L A4C: 50 ML (ref 22–52)
ECHO LA VOL BP: 50 ML (ref 22–52)
ECHO LA VOL MOD A2C: 49 ML (ref 22–52)
ECHO LA VOL MOD A4C: 46 ML (ref 22–52)
ECHO LA VOL/BSA BIPLANE: 29 ML/M2 (ref 16–34)
ECHO LA VOLUME AREA LENGTH: 55 ML
ECHO LA VOLUME INDEX A-L A2C: 30 ML/M2 (ref 16–34)
ECHO LA VOLUME INDEX A-L A4C: 29 ML/M2 (ref 16–34)
ECHO LA VOLUME INDEX AREA LENGTH: 32 ML/M2 (ref 16–34)
ECHO LA VOLUME INDEX MOD A2C: 28 ML/M2 (ref 16–34)
ECHO LA VOLUME INDEX MOD A4C: 27 ML/M2 (ref 16–34)
ECHO LV E' LATERAL VELOCITY: 17 CM/S
ECHO LV E' SEPTAL VELOCITY: 10 CM/S
ECHO LV EJECTION FRACTION A2C: 50 %
ECHO LV EJECTION FRACTION A4C: 51 %
ECHO LV FRACTIONAL SHORTENING: 38 % (ref 28–44)
ECHO LV GLOBAL LONGITUDINAL STRAIN (GLS): -15 %
ECHO LV GLOBAL LONGITUDINAL STRAIN (GLS): -17.5 %
ECHO LV GLOBAL LONGITUDINAL STRAIN (GLS): -18.5 %
ECHO LV GLOBAL LONGITUDINAL STRAIN (GLS): -19 %
ECHO LV INTERNAL DIMENSION DIASTOLE INDEX: 2.77 CM/M2
ECHO LV INTERNAL DIMENSION DIASTOLIC: 4.8 CM (ref 3.9–5.3)
ECHO LV INTERNAL DIMENSION SYSTOLIC INDEX: 1.73 CM/M2
ECHO LV INTERNAL DIMENSION SYSTOLIC: 3 CM
ECHO LV IVSD: 0.9 CM (ref 0.6–0.9)
ECHO LV MASS 2D: 147.8 G (ref 67–162)
ECHO LV MASS INDEX 2D: 85.4 G/M2 (ref 43–95)
ECHO LV POSTERIOR WALL DIASTOLIC: 0.9 CM (ref 0.6–0.9)
ECHO LV RELATIVE WALL THICKNESS RATIO: 0.38
ECHO LVOT AREA: 3.5 CM2
ECHO LVOT AV VTI INDEX: 0.7
ECHO LVOT DIAM: 2.1 CM
ECHO LVOT MEAN GRADIENT: 1 MMHG
ECHO LVOT PEAK GRADIENT: 2 MMHG
ECHO LVOT PEAK VELOCITY: 0.8 M/S
ECHO LVOT STROKE VOLUME INDEX: 29 ML/M2
ECHO LVOT SV: 50.2 ML
ECHO LVOT VTI: 14.5 CM
ECHO MV A VELOCITY: 0.5 M/S
ECHO MV E DECELERATION TIME (DT): 172.7 MS
ECHO MV E VELOCITY: 0.69 M/S
ECHO MV E/A RATIO: 1.38
ECHO MV E/E' LATERAL: 4.06
ECHO MV E/E' RATIO (AVERAGED): 5.48
ECHO PULMONARY ARTERY END DIASTOLIC PRESSURE: 4 MMHG
ECHO PV MAX VELOCITY: 0.9 M/S
ECHO PV PEAK GRADIENT: 3 MMHG
ECHO PV REGURGITANT MAX VELOCITY: 1.1 M/S
ECHO RA MINOR AXIS INDEX: 1.85 CM/M2
ECHO RA MINOR AXIS: 3.2 CM
ECHO RV INTERNAL DIMENSION: 3.4 CM
ECHO RV TAPSE: 1.8 CM (ref 1.7–?)

## 2024-01-12 ENCOUNTER — TELEPHONE (OUTPATIENT)
Age: 29
End: 2024-01-12

## 2024-02-16 ENCOUNTER — TELEMEDICINE (OUTPATIENT)
Age: 29
End: 2024-02-16
Payer: COMMERCIAL

## 2024-02-16 DIAGNOSIS — J45.20 MILD INTERMITTENT ASTHMA, UNSPECIFIED WHETHER COMPLICATED: ICD-10-CM

## 2024-02-16 DIAGNOSIS — J30.1 SEASONAL ALLERGIC RHINITIS DUE TO POLLEN: ICD-10-CM

## 2024-02-16 DIAGNOSIS — L70.9 ACNE, UNSPECIFIED ACNE TYPE: Primary | ICD-10-CM

## 2024-02-16 PROCEDURE — 99214 OFFICE O/P EST MOD 30 MIN: CPT | Performed by: INTERNAL MEDICINE

## 2024-02-16 RX ORDER — DOXYCYCLINE HYCLATE 100 MG
100 TABLET ORAL DAILY
Qty: 30 TABLET | Refills: 1 | Status: SHIPPED | OUTPATIENT
Start: 2024-02-16 | End: 2024-04-16

## 2024-02-16 RX ORDER — LEVOCETIRIZINE DIHYDROCHLORIDE 5 MG/1
5 TABLET, FILM COATED ORAL NIGHTLY
Qty: 30 TABLET | Refills: 1 | Status: SHIPPED | OUTPATIENT
Start: 2024-02-16

## 2024-02-16 RX ORDER — CLINDAMYCIN PHOSPHATE 10 MG/G
GEL TOPICAL
Qty: 30 G | Refills: 0 | Status: SHIPPED | OUTPATIENT
Start: 2024-02-16 | End: 2024-02-23

## 2024-02-16 RX ORDER — FLUTICASONE PROPIONATE 50 MCG
2 SPRAY, SUSPENSION (ML) NASAL DAILY
Qty: 16 G | Refills: 0 | Status: SHIPPED | OUTPATIENT
Start: 2024-02-16

## 2024-02-16 NOTE — PROGRESS NOTES
Huyen Rothman, was evaluated through a synchronous (real-time) audio-video encounter. The patient (or guardian if applicable) is aware that this is a billable service, which includes applicable co-pays. This Virtual Visit was conducted with patient's (and/or legal guardian's) consent. Patient identification was verified, and a caregiver was present when appropriate.   The patient was located at Home: [address]  [place] [facility] [facility] [id number]  Provider was located at Facility (Appt Dept): [address]  [address]  [place] [address]      Huyen Rothman (:  1995) is a Established patient, presenting virtually for evaluation of the following:    Assessment & Plan   Below is the assessment and plan developed based on review of pertinent history, physical exam, labs, studies, and medications.  1. Acne, unspecified acne type  Advised to use Neutrogena face wash.  Will give,    -     doxycycline hyclate (VIBRA-TABS) 100 MG tablet; Take 1 tablet by mouth daily, Disp-30 tablet, R-1Normal  -     clindamycin (CLEOCIN-T) 1 % gel; Apply topically 2 times daily., Disp-30 g, R-0, Normal    She borrowed tretinoin from her friend, advised her not to use it since she is going on but not ready to  2. Mild intermittent asthma, unspecified whether complicated  Taking albuterol inhaler as needed.  3. Seasonal allergic rhinitis due to pollen      Will call in,  -     levocetirizine (XYZAL) 5 MG tablet; Take 1 tablet by mouth nightly, Disp-30 tablet, R-1Normal  -     fluticasone (FLONASE) 50 MCG/ACT nasal spray; 2 sprays by Nasal route daily, Disp-16 g, R-0Normal    No follow-ups on file.       Subjective     Tom is here for follow-up.  She is having a lot of acne on her face.  She follows-tretinoin cream.  Like to get a refill.  She is not on birth control pill.  Also suffer from allergy, having a lot of nasal condition  Repeat TSH level in the past, would like to get a refill  .  She suffer from asthma currently
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
